# Patient Record
Sex: MALE | Race: WHITE | NOT HISPANIC OR LATINO | Employment: FULL TIME | URBAN - METROPOLITAN AREA
[De-identification: names, ages, dates, MRNs, and addresses within clinical notes are randomized per-mention and may not be internally consistent; named-entity substitution may affect disease eponyms.]

---

## 2022-01-14 ENCOUNTER — LAB REQUISITION (OUTPATIENT)
Dept: LAB | Facility: CLINIC | Age: 57
End: 2022-01-14

## 2022-01-14 DIAGNOSIS — L08.9 LOCAL INFECTION OF THE SKIN AND SUBCUTANEOUS TISSUE, UNSPECIFIED: ICD-10-CM

## 2022-01-14 PROCEDURE — 87077 CULTURE AEROBIC IDENTIFY: CPT | Mod: ORL | Performed by: DERMATOLOGY

## 2022-01-14 PROCEDURE — 87798 DETECT AGENT NOS DNA AMP: CPT | Mod: ORL | Performed by: DERMATOLOGY

## 2022-01-15 LAB — VZV DNA SPEC QL NAA+PROBE: NEGATIVE

## 2022-01-16 LAB — BACTERIA SPEC CULT: ABNORMAL

## 2022-09-26 ENCOUNTER — HOSPITAL ENCOUNTER (INPATIENT)
Facility: CLINIC | Age: 57
LOS: 2 days | Discharge: HOME | DRG: 247 | End: 2022-09-28
Attending: EMERGENCY MEDICINE | Admitting: INTERNAL MEDICINE

## 2022-09-26 ENCOUNTER — APPOINTMENT (OUTPATIENT)
Dept: CT IMAGING | Facility: CLINIC | Age: 57
DRG: 247 | End: 2022-09-26
Attending: EMERGENCY MEDICINE

## 2022-09-26 ENCOUNTER — APPOINTMENT (OUTPATIENT)
Dept: GENERAL RADIOLOGY | Facility: CLINIC | Age: 57
DRG: 247 | End: 2022-09-26
Attending: EMERGENCY MEDICINE

## 2022-09-26 DIAGNOSIS — C43.9 MELANOMA OF SKIN (H): ICD-10-CM

## 2022-09-26 DIAGNOSIS — I21.9 ACUTE MYOCARDIAL INFARCTION, INITIAL EPISODE OF CARE (H): ICD-10-CM

## 2022-09-26 DIAGNOSIS — I21.4 NSTEMI (NON-ST ELEVATED MYOCARDIAL INFARCTION) (H): Primary | ICD-10-CM

## 2022-09-26 LAB
ACT BLD: 228 SECONDS (ref 74–150)
ACT BLD: 271 SECONDS (ref 74–150)
ANION GAP SERPL CALCULATED.3IONS-SCNC: 7 MMOL/L (ref 3–14)
BASOPHILS # BLD AUTO: 0 10E3/UL (ref 0–0.2)
BASOPHILS NFR BLD AUTO: 1 %
BUN SERPL-MCNC: 22 MG/DL (ref 7–30)
CALCIUM SERPL-MCNC: 9.2 MG/DL (ref 8.5–10.1)
CHLORIDE BLD-SCNC: 104 MMOL/L (ref 94–109)
CHOLEST SERPL-MCNC: 143 MG/DL
CO2 SERPL-SCNC: 28 MMOL/L (ref 20–32)
CREAT SERPL-MCNC: 1.08 MG/DL (ref 0.66–1.25)
EOSINOPHIL # BLD AUTO: 0.2 10E3/UL (ref 0–0.7)
EOSINOPHIL NFR BLD AUTO: 5 %
ERYTHROCYTE [DISTWIDTH] IN BLOOD BY AUTOMATED COUNT: 11.6 % (ref 10–15)
GFR SERPL CREATININE-BSD FRML MDRD: 81 ML/MIN/1.73M2
GLUCOSE BLD-MCNC: 137 MG/DL (ref 70–99)
HCT VFR BLD AUTO: 49.1 % (ref 40–53)
HDLC SERPL-MCNC: 28 MG/DL
HGB BLD-MCNC: 17.3 G/DL (ref 13.3–17.7)
IMM GRANULOCYTES # BLD: 0 10E3/UL
IMM GRANULOCYTES NFR BLD: 1 %
LDLC SERPL CALC-MCNC: 94 MG/DL
LYMPHOCYTES # BLD AUTO: 2.1 10E3/UL (ref 0.8–5.3)
LYMPHOCYTES NFR BLD AUTO: 42 %
MCH RBC QN AUTO: 32.2 PG (ref 26.5–33)
MCHC RBC AUTO-ENTMCNC: 35.2 G/DL (ref 31.5–36.5)
MCV RBC AUTO: 91 FL (ref 78–100)
MONOCYTES # BLD AUTO: 0.4 10E3/UL (ref 0–1.3)
MONOCYTES NFR BLD AUTO: 8 %
NEUTROPHILS # BLD AUTO: 2.1 10E3/UL (ref 1.6–8.3)
NEUTROPHILS NFR BLD AUTO: 43 %
NONHDLC SERPL-MCNC: 115 MG/DL
NRBC # BLD AUTO: 0 10E3/UL
NRBC BLD AUTO-RTO: 0 /100
PLATELET # BLD AUTO: 213 10E3/UL (ref 150–450)
POTASSIUM BLD-SCNC: 3.9 MMOL/L (ref 3.4–5.3)
RBC # BLD AUTO: 5.37 10E6/UL (ref 4.4–5.9)
SARS-COV-2 RNA RESP QL NAA+PROBE: NEGATIVE
SODIUM SERPL-SCNC: 139 MMOL/L (ref 133–144)
TRIGL SERPL-MCNC: 105 MG/DL
TROPONIN I SERPL HS-MCNC: 136 NG/L
TROPONIN I SERPL HS-MCNC: 212 NG/L
TROPONIN I SERPL HS-MCNC: 995 NG/L
UFH PPP CHRO-ACNC: 0.25 IU/ML
WBC # BLD AUTO: 4.8 10E3/UL (ref 4–11)

## 2022-09-26 PROCEDURE — 99152 MOD SED SAME PHYS/QHP 5/>YRS: CPT | Performed by: INTERNAL MEDICINE

## 2022-09-26 PROCEDURE — 93010 ELECTROCARDIOGRAM REPORT: CPT | Performed by: INTERNAL MEDICINE

## 2022-09-26 PROCEDURE — 85520 HEPARIN ASSAY: CPT | Performed by: EMERGENCY MEDICINE

## 2022-09-26 PROCEDURE — 85347 COAGULATION TIME ACTIVATED: CPT

## 2022-09-26 PROCEDURE — 250N000011 HC RX IP 250 OP 636: Performed by: INTERNAL MEDICINE

## 2022-09-26 PROCEDURE — 93005 ELECTROCARDIOGRAM TRACING: CPT

## 2022-09-26 PROCEDURE — C1887 CATHETER, GUIDING: HCPCS | Performed by: INTERNAL MEDICINE

## 2022-09-26 PROCEDURE — 99223 1ST HOSP IP/OBS HIGH 75: CPT | Mod: 25 | Performed by: INTERNAL MEDICINE

## 2022-09-26 PROCEDURE — 250N000013 HC RX MED GY IP 250 OP 250 PS 637: Performed by: INTERNAL MEDICINE

## 2022-09-26 PROCEDURE — 85025 COMPLETE CBC W/AUTO DIFF WBC: CPT | Performed by: EMERGENCY MEDICINE

## 2022-09-26 PROCEDURE — C1769 GUIDE WIRE: HCPCS | Performed by: INTERNAL MEDICINE

## 2022-09-26 PROCEDURE — 36415 COLL VENOUS BLD VENIPUNCTURE: CPT | Performed by: EMERGENCY MEDICINE

## 2022-09-26 PROCEDURE — 99153 MOD SED SAME PHYS/QHP EA: CPT | Performed by: INTERNAL MEDICINE

## 2022-09-26 PROCEDURE — 99285 EMERGENCY DEPT VISIT HI MDM: CPT | Mod: 25

## 2022-09-26 PROCEDURE — 258N000003 HC RX IP 258 OP 636: Performed by: INTERNAL MEDICINE

## 2022-09-26 PROCEDURE — 250N000009 HC RX 250: Performed by: INTERNAL MEDICINE

## 2022-09-26 PROCEDURE — 92928 PRQ TCAT PLMT NTRAC ST 1 LES: CPT | Mod: LC | Performed by: INTERNAL MEDICINE

## 2022-09-26 PROCEDURE — 84484 ASSAY OF TROPONIN QUANT: CPT | Performed by: INTERNAL MEDICINE

## 2022-09-26 PROCEDURE — U0003 INFECTIOUS AGENT DETECTION BY NUCLEIC ACID (DNA OR RNA); SEVERE ACUTE RESPIRATORY SYNDROME CORONAVIRUS 2 (SARS-COV-2) (CORONAVIRUS DISEASE [COVID-19]), AMPLIFIED PROBE TECHNIQUE, MAKING USE OF HIGH THROUGHPUT TECHNOLOGIES AS DESCRIBED BY CMS-2020-01-R: HCPCS | Performed by: EMERGENCY MEDICINE

## 2022-09-26 PROCEDURE — 93454 CORONARY ARTERY ANGIO S&I: CPT | Mod: 26 | Performed by: INTERNAL MEDICINE

## 2022-09-26 PROCEDURE — 250N000009 HC RX 250: Performed by: EMERGENCY MEDICINE

## 2022-09-26 PROCEDURE — 272N000001 HC OR GENERAL SUPPLY STERILE: Performed by: INTERNAL MEDICINE

## 2022-09-26 PROCEDURE — C1874 STENT, COATED/COV W/DEL SYS: HCPCS | Performed by: INTERNAL MEDICINE

## 2022-09-26 PROCEDURE — C1725 CATH, TRANSLUMIN NON-LASER: HCPCS | Performed by: INTERNAL MEDICINE

## 2022-09-26 PROCEDURE — C1894 INTRO/SHEATH, NON-LASER: HCPCS | Performed by: INTERNAL MEDICINE

## 2022-09-26 PROCEDURE — 80061 LIPID PANEL: CPT | Performed by: INTERNAL MEDICINE

## 2022-09-26 PROCEDURE — 83036 HEMOGLOBIN GLYCOSYLATED A1C: CPT | Performed by: PHYSICIAN ASSISTANT

## 2022-09-26 PROCEDURE — 027034Z DILATION OF CORONARY ARTERY, ONE ARTERY WITH DRUG-ELUTING INTRALUMINAL DEVICE, PERCUTANEOUS APPROACH: ICD-10-PCS | Performed by: INTERNAL MEDICINE

## 2022-09-26 PROCEDURE — 96374 THER/PROPH/DIAG INJ IV PUSH: CPT | Mod: 59

## 2022-09-26 PROCEDURE — 84484 ASSAY OF TROPONIN QUANT: CPT | Performed by: EMERGENCY MEDICINE

## 2022-09-26 PROCEDURE — 36415 COLL VENOUS BLD VENIPUNCTURE: CPT | Performed by: INTERNAL MEDICINE

## 2022-09-26 PROCEDURE — 71046 X-RAY EXAM CHEST 2 VIEWS: CPT

## 2022-09-26 PROCEDURE — 71275 CT ANGIOGRAPHY CHEST: CPT

## 2022-09-26 PROCEDURE — 210N000002 HC R&B HEART CARE

## 2022-09-26 PROCEDURE — 250N000011 HC RX IP 250 OP 636: Performed by: EMERGENCY MEDICINE

## 2022-09-26 PROCEDURE — 82435 ASSAY OF BLOOD CHLORIDE: CPT | Performed by: EMERGENCY MEDICINE

## 2022-09-26 PROCEDURE — 250N000013 HC RX MED GY IP 250 OP 250 PS 637: Performed by: EMERGENCY MEDICINE

## 2022-09-26 PROCEDURE — 93454 CORONARY ARTERY ANGIO S&I: CPT | Performed by: INTERNAL MEDICINE

## 2022-09-26 PROCEDURE — B2111ZZ FLUOROSCOPY OF MULTIPLE CORONARY ARTERIES USING LOW OSMOLAR CONTRAST: ICD-10-PCS | Performed by: INTERNAL MEDICINE

## 2022-09-26 PROCEDURE — 99223 1ST HOSP IP/OBS HIGH 75: CPT | Mod: AI | Performed by: INTERNAL MEDICINE

## 2022-09-26 PROCEDURE — C9600 PERC DRUG-EL COR STENT SING: HCPCS | Mod: LC | Performed by: INTERNAL MEDICINE

## 2022-09-26 PROCEDURE — 99152 MOD SED SAME PHYS/QHP 5/>YRS: CPT | Mod: GC | Performed by: INTERNAL MEDICINE

## 2022-09-26 PROCEDURE — 93005 ELECTROCARDIOGRAM TRACING: CPT | Mod: 76

## 2022-09-26 PROCEDURE — C9803 HOPD COVID-19 SPEC COLLECT: HCPCS

## 2022-09-26 DEVICE — STENT CORONARY DES SYNERGY XD MR US 3.00X28MM H7493941828300: Type: IMPLANTABLE DEVICE | Status: FUNCTIONAL

## 2022-09-26 RX ORDER — ASPIRIN 325 MG
325 TABLET ORAL ONCE
Status: DISCONTINUED | OUTPATIENT
Start: 2022-09-26 | End: 2022-09-26 | Stop reason: HOSPADM

## 2022-09-26 RX ORDER — NALOXONE HYDROCHLORIDE 0.4 MG/ML
0.2 INJECTION, SOLUTION INTRAMUSCULAR; INTRAVENOUS; SUBCUTANEOUS
Status: DISCONTINUED | OUTPATIENT
Start: 2022-09-26 | End: 2022-09-28 | Stop reason: HOSPADM

## 2022-09-26 RX ORDER — IOPAMIDOL 755 MG/ML
INJECTION, SOLUTION INTRAVASCULAR
Status: DISCONTINUED | OUTPATIENT
Start: 2022-09-26 | End: 2022-09-26 | Stop reason: HOSPADM

## 2022-09-26 RX ORDER — NITROGLYCERIN 0.4 MG/1
0.4 TABLET SUBLINGUAL EVERY 5 MIN PRN
Status: DISCONTINUED | OUTPATIENT
Start: 2022-09-26 | End: 2022-09-27

## 2022-09-26 RX ORDER — NALOXONE HYDROCHLORIDE 0.4 MG/ML
0.4 INJECTION, SOLUTION INTRAMUSCULAR; INTRAVENOUS; SUBCUTANEOUS
Status: DISCONTINUED | OUTPATIENT
Start: 2022-09-26 | End: 2022-09-28 | Stop reason: HOSPADM

## 2022-09-26 RX ORDER — ASPIRIN 81 MG/1
243 TABLET, CHEWABLE ORAL ONCE
Status: DISCONTINUED | OUTPATIENT
Start: 2022-09-26 | End: 2022-09-26 | Stop reason: HOSPADM

## 2022-09-26 RX ORDER — ONDANSETRON 4 MG/1
4 TABLET, ORALLY DISINTEGRATING ORAL EVERY 6 HOURS PRN
Status: DISCONTINUED | OUTPATIENT
Start: 2022-09-26 | End: 2022-09-26

## 2022-09-26 RX ORDER — LIDOCAINE 40 MG/G
CREAM TOPICAL
Status: DISCONTINUED | OUTPATIENT
Start: 2022-09-26 | End: 2022-09-26

## 2022-09-26 RX ORDER — ROSUVASTATIN CALCIUM 20 MG/1
20 TABLET, COATED ORAL DAILY
Status: DISCONTINUED | OUTPATIENT
Start: 2022-09-26 | End: 2022-09-28 | Stop reason: HOSPADM

## 2022-09-26 RX ORDER — ASPIRIN 81 MG/1
81 TABLET ORAL DAILY
Status: DISCONTINUED | OUTPATIENT
Start: 2022-09-27 | End: 2022-09-27

## 2022-09-26 RX ORDER — HEPARIN SODIUM 1000 [USP'U]/ML
INJECTION, SOLUTION INTRAVENOUS; SUBCUTANEOUS
Status: DISCONTINUED | OUTPATIENT
Start: 2022-09-26 | End: 2022-09-26 | Stop reason: HOSPADM

## 2022-09-26 RX ORDER — ONDANSETRON 4 MG/1
4 TABLET, ORALLY DISINTEGRATING ORAL EVERY 6 HOURS PRN
Status: DISCONTINUED | OUTPATIENT
Start: 2022-09-26 | End: 2022-09-27

## 2022-09-26 RX ORDER — ONDANSETRON 2 MG/ML
4 INJECTION INTRAMUSCULAR; INTRAVENOUS EVERY 6 HOURS PRN
Status: DISCONTINUED | OUTPATIENT
Start: 2022-09-26 | End: 2022-09-27

## 2022-09-26 RX ORDER — ONDANSETRON 2 MG/ML
4 INJECTION INTRAMUSCULAR; INTRAVENOUS EVERY 6 HOURS PRN
Status: DISCONTINUED | OUTPATIENT
Start: 2022-09-26 | End: 2022-09-26

## 2022-09-26 RX ORDER — METOPROLOL TARTRATE 1 MG/ML
5 INJECTION, SOLUTION INTRAVENOUS
Status: DISCONTINUED | OUTPATIENT
Start: 2022-09-26 | End: 2022-09-27

## 2022-09-26 RX ORDER — LORAZEPAM 0.5 MG/1
0.5 TABLET ORAL
Status: DISCONTINUED | OUTPATIENT
Start: 2022-09-26 | End: 2022-09-26 | Stop reason: HOSPADM

## 2022-09-26 RX ORDER — ASPIRIN 81 MG/1
81 TABLET, CHEWABLE ORAL ONCE
Status: DISCONTINUED | OUTPATIENT
Start: 2022-09-26 | End: 2022-09-26

## 2022-09-26 RX ORDER — NITROGLYCERIN 5 MG/ML
VIAL (ML) INTRAVENOUS
Status: DISCONTINUED | OUTPATIENT
Start: 2022-09-26 | End: 2022-09-26 | Stop reason: HOSPADM

## 2022-09-26 RX ORDER — LIDOCAINE 40 MG/G
CREAM TOPICAL
Status: DISCONTINUED | OUTPATIENT
Start: 2022-09-26 | End: 2022-09-28 | Stop reason: HOSPADM

## 2022-09-26 RX ORDER — POTASSIUM CHLORIDE 1500 MG/1
20 TABLET, EXTENDED RELEASE ORAL
Status: DISCONTINUED | OUTPATIENT
Start: 2022-09-26 | End: 2022-09-26 | Stop reason: HOSPADM

## 2022-09-26 RX ORDER — LORAZEPAM 2 MG/ML
0.5 INJECTION INTRAMUSCULAR
Status: DISCONTINUED | OUTPATIENT
Start: 2022-09-26 | End: 2022-09-26 | Stop reason: HOSPADM

## 2022-09-26 RX ORDER — ATORVASTATIN CALCIUM 80 MG/1
80 TABLET, FILM COATED ORAL DAILY
Status: DISCONTINUED | OUTPATIENT
Start: 2022-09-26 | End: 2022-09-26 | Stop reason: ALTCHOICE

## 2022-09-26 RX ORDER — HEPARIN SODIUM 10000 [USP'U]/100ML
0-5000 INJECTION, SOLUTION INTRAVENOUS CONTINUOUS
Status: DISCONTINUED | OUTPATIENT
Start: 2022-09-26 | End: 2022-09-26

## 2022-09-26 RX ORDER — ACETAMINOPHEN 325 MG/1
650 TABLET ORAL EVERY 4 HOURS PRN
Status: DISCONTINUED | OUTPATIENT
Start: 2022-09-26 | End: 2022-09-27

## 2022-09-26 RX ORDER — IOPAMIDOL 755 MG/ML
62 INJECTION, SOLUTION INTRAVASCULAR ONCE
Status: COMPLETED | OUTPATIENT
Start: 2022-09-26 | End: 2022-09-26

## 2022-09-26 RX ORDER — SODIUM CHLORIDE 9 MG/ML
INJECTION, SOLUTION INTRAVENOUS CONTINUOUS
Status: DISCONTINUED | OUTPATIENT
Start: 2022-09-26 | End: 2022-09-26 | Stop reason: HOSPADM

## 2022-09-26 RX ORDER — MORPHINE SULFATE 2 MG/ML
1 INJECTION, SOLUTION INTRAMUSCULAR; INTRAVENOUS
Status: DISCONTINUED | OUTPATIENT
Start: 2022-09-26 | End: 2022-09-28 | Stop reason: HOSPADM

## 2022-09-26 RX ORDER — VERAPAMIL HYDROCHLORIDE 2.5 MG/ML
INJECTION, SOLUTION INTRAVENOUS
Status: DISCONTINUED | OUTPATIENT
Start: 2022-09-26 | End: 2022-09-26 | Stop reason: HOSPADM

## 2022-09-26 RX ORDER — HYDRALAZINE HYDROCHLORIDE 20 MG/ML
10 INJECTION INTRAMUSCULAR; INTRAVENOUS EVERY 4 HOURS PRN
Status: DISCONTINUED | OUTPATIENT
Start: 2022-09-26 | End: 2022-09-27

## 2022-09-26 RX ORDER — FENTANYL CITRATE 50 UG/ML
INJECTION, SOLUTION INTRAMUSCULAR; INTRAVENOUS
Status: DISCONTINUED | OUTPATIENT
Start: 2022-09-26 | End: 2022-09-26 | Stop reason: HOSPADM

## 2022-09-26 RX ORDER — SODIUM CHLORIDE 9 MG/ML
INJECTION, SOLUTION INTRAVENOUS CONTINUOUS
Status: DISCONTINUED | OUTPATIENT
Start: 2022-09-26 | End: 2022-09-28 | Stop reason: HOSPADM

## 2022-09-26 RX ORDER — NITROGLYCERIN 0.4 MG/1
0.4 TABLET SUBLINGUAL EVERY 5 MIN PRN
Status: DISCONTINUED | OUTPATIENT
Start: 2022-09-26 | End: 2022-09-26

## 2022-09-26 RX ORDER — ACETAMINOPHEN 325 MG/1
650 TABLET ORAL EVERY 6 HOURS PRN
Status: DISCONTINUED | OUTPATIENT
Start: 2022-09-26 | End: 2022-09-26

## 2022-09-26 RX ORDER — ACETAMINOPHEN 650 MG/1
650 SUPPOSITORY RECTAL EVERY 6 HOURS PRN
Status: DISCONTINUED | OUTPATIENT
Start: 2022-09-26 | End: 2022-09-28 | Stop reason: HOSPADM

## 2022-09-26 RX ORDER — HYDROMORPHONE HYDROCHLORIDE 1 MG/ML
0.5 INJECTION, SOLUTION INTRAMUSCULAR; INTRAVENOUS; SUBCUTANEOUS ONCE
Status: DISCONTINUED | OUTPATIENT
Start: 2022-09-26 | End: 2022-09-26

## 2022-09-26 RX ORDER — ASPIRIN 81 MG/1
324 TABLET, CHEWABLE ORAL ONCE
Status: COMPLETED | OUTPATIENT
Start: 2022-09-26 | End: 2022-09-26

## 2022-09-26 RX ADMIN — ROSUVASTATIN CALCIUM 20 MG: 20 TABLET, FILM COATED ORAL at 10:33

## 2022-09-26 RX ADMIN — SODIUM CHLORIDE: 9 INJECTION, SOLUTION INTRAVENOUS at 13:25

## 2022-09-26 RX ADMIN — TICAGRELOR 90 MG: 90 TABLET ORAL at 19:16

## 2022-09-26 RX ADMIN — NITROGLYCERIN 0.4 MG: 0.4 TABLET SUBLINGUAL at 06:39

## 2022-09-26 RX ADMIN — ACETAMINOPHEN 650 MG: 325 TABLET, FILM COATED ORAL at 13:24

## 2022-09-26 RX ADMIN — SODIUM CHLORIDE 88 ML: 900 INJECTION INTRAVENOUS at 07:18

## 2022-09-26 RX ADMIN — IOPAMIDOL 62 ML: 755 INJECTION, SOLUTION INTRAVENOUS at 07:18

## 2022-09-26 RX ADMIN — SODIUM CHLORIDE: 9 INJECTION, SOLUTION INTRAVENOUS at 10:35

## 2022-09-26 RX ADMIN — HEPARIN SODIUM 850 UNITS/HR: 10000 INJECTION, SOLUTION INTRAVENOUS at 07:43

## 2022-09-26 RX ADMIN — ASPIRIN 81 MG CHEWABLE TABLET 324 MG: 81 TABLET CHEWABLE at 06:38

## 2022-09-26 ASSESSMENT — ENCOUNTER SYMPTOMS
VOMITING: 0
COUGH: 0
HEADACHES: 1
FEVER: 0
CHEST TIGHTNESS: 1
DIAPHORESIS: 0
SHORTNESS OF BREATH: 1
ABDOMINAL PAIN: 0
NECK PAIN: 1
DIARRHEA: 0
NAUSEA: 0

## 2022-09-26 ASSESSMENT — ACTIVITIES OF DAILY LIVING (ADL)
ADLS_ACUITY_SCORE: 35
ADLS_ACUITY_SCORE: 35
ADLS_ACUITY_SCORE: 18
ADLS_ACUITY_SCORE: 35
ADLS_ACUITY_SCORE: 18

## 2022-09-26 NOTE — H&P
[facility]  Hospitalist History and Physical    Name: Jorge Thomas    MRN: [id number]  YOB: 1965    Age: 57 year old  Date of Admission:  9/26/2022  Physician:  Presley Monae DO, FHM  Securely message with the Vocera Web Console (learn more here)  Text page via [facility] Paging/Directory         Assessment & Plan   Jorge Thomas is a 57 year old male without prior cardiac history who presented to the [facility] ER with chest discomfort.    NSTEMI:  Summary:  56 y/o male presented with chest discomfort this AM and associated mildly elevated troponin.  Also had a similar episode of chest discomfort about 2 weeks ago he did not seek eval for.  Chest imaging negative for PE/pneumonia.  He has incidentally noted coronary calcifications.    -  Admit  -   mg given already, will continue 81 mg daily starting tomorrow  -  Continue heparin drip started in ED  -  Echocardiogram this AM, consider beta blocker  -  NPO for possible cath need  -  Check lipid panel (added on to initial labs)  -  Nitroglycerin/opioids for pain  -  Trend troponin  -  Cardiology consult, discussed patient with Dr. Mattson    History of melanoma:  -  No recent issues, diagnosed over 5 years ago.  Follows for routine skin monitoring.    COVID Status/Screening:  COVID-19 PCR Results    COVID-19 PCR Results 4/26/21 9/26/22   COVID-19 Virus by PCR (External Result) Not Detected    SARS CoV2 PCR  Negative      Comments are available for some flowsheets but are not being displayed.         COVID-19 Antibody Results, Testing for Immunity    COVID-19 Antibody Results, Testing for Immunity   No data to display.            DVT Prophylaxis: Pneumatic Compression Devices + heparin drip  Code Status: Full Code  Rubio Catheter: Not present  Central Lines: None  Cardiac Monitoring: None    Disposition: Expect 2 night hospital stay.    Primary Care Physician   Blaire Hollins, [phone]    Chief Complaint   Chest pain    History is  obtained from the patient.  I also spoke with the ER provider about the history.     History of Present Illness   Jorge Thomas is a 57 year old male who presents with chest discomfort.  He awoke this AM with it and it was a significant central chest pressure that radiated to left arm.  He had a mild associated headache.  He had SOB and was somewhat sweaty with it.  Pain rated 7/10 or higher at its worst.  It persisted and he came in for eval.  In the ED after nitroglycerin + opioids the pain is now almost gone.  No history of prior known cardiac problems.  He doesn't smoke.  He denies major medical problems except for melanoma.  He follows with derm  but otherwise admits he hasn't seen many providers/had a physical for a few years.   He had a similar chest pain episode 2 weeks ago on a business trip when walking around.  He rested and it resolved after several minutes.  He did not seek eval for that episode.  In between he has gone on walks with his wife and done some [employer] fitness activity without chest pain or SOB.  His father had cardiac bypass in his 60's.  His mother has had some arrhythmia issues.  Patient denies other acute complaints.    Past Medical History    Past Medical History:   Diagnosis Date   Allergic rhinitis    Melanoma Skin    Past Surgical History   Past Surgical History:   Procedure Laterality Date   APPENDECTOMY     Eye Surgery    Prior to Admission Medications   Denies any prescription or routine OTC/Herbal meds    Allergies   No Known Allergies    Social History   Social History     Tobacco Use     Smoking status: Never Smoker     Smokeless tobacco: Never Used   Substance Use Topics     Alcohol use: Yes   (Rare alcohol)  Social History     Social History Narrative    , 2 kids, works as a        Family History   See above concerning cardiac hx.  Reviewed, otherwise negative for contributory info.    Review of Systems   A Comprehensive greater than 10 system review of  systems was carried out.  Pertinent positives and negatives are noted above.  Otherwise negative for contributory information.    Physical Exam   Temp: 98.1  F (36.7  C) Temp src: Oral BP: (!) 151/108 Pulse: 67   Resp: 8 SpO2: 99 %      Vital Signs with Ranges  Temp:  [98.1  F (36.7  C)] 98.1  F (36.7  C)  Pulse:  [67-71] 67  Resp:  [8-18] 8  BP: (151)/(108) 151/108  SpO2:  [98 %-99 %] 99 %  158 lbs 0 oz    GEN:  Alert, oriented x 3, appears comfortable, no overt distress  HEENT:  Normocephalic/atraumatic, no scleral icterus, no nasal discharge, mouth moist.  CV:  Regular rate and rhythm, no murmur or JVD.  S1 + S2 noted, no S3 or S4.  LUNGS:  Clear to auscultation bilaterally without rales/rhonchi/wheezing/retractions.  Symmetric chest rise on inhalation noted.  ABD:  Active bowel sounds, soft, non-tender, mildly distended.  No guarding/rigidity.  EXT:  No edema.  No cyanosis.  No acute joint synovitis noted.  SKIN:  Dry to touch, no exanthems noted in the visualized areas.  NEURO:  Moving extremities well on general exam, sensation to touch grossly intact.  No new focal deficits appreciated.      Data   Data reviewed today:  I personally reviewed the EKG tracing showing no dramatic ST elevation/depression.  A little peaking of T waves.  (2 EKG's reviewed).    Recent Labs   Lab 09/26/22  0606   WBC 4.8   HGB 17.3   HCT 49.1   MCV 91        Recent Labs   Lab 09/26/22  0606      POTASSIUM 3.9   CHLORIDE 104   CO2 28   ANIONGAP 7   *   BUN 22   CR 1.08   GFRESTIMATED 81   MICHELLE 9.2     Recent Labs   Lab 09/26/22  0606   TROPONINIS 136*       Recent Results (from the past 24 hour(s))   Chest XR,  PA & LAT    Narrative    EXAM: XR CHEST 2 VIEWS  LOCATION: [facility]  DATE/TIME: 9/26/2022 6:53 AM    INDICATION: chest pain  COMPARISON: None.      Impression    IMPRESSION: Heart size is normal. Lungs are clear. No pneumothorax or pleural effusion.   CT Chest Pulmonary Embolism w  Contrast    Narrative    CT CHEST PULMONARY EMBOLISM W CONTRAST 9/26/2022 7:25 AM    CLINICAL HISTORY: chest pain, shortness of breath  TECHNIQUE: CT angiogram chest during arterial phase injection IV  contrast. 2D and 3D MIP reconstructions were performed by the CT  technologist. Dose reduction techniques were used.     CONTRAST: 62mL Isovue-370    COMPARISON: None.    FINDINGS:  ANGIOGRAM CHEST: Pulmonary arteries are normal caliber and negative  for pulmonary emboli. Thoracic aorta is negative for dissection. No CT  evidence of right heart strain.    LUNGS AND PLEURA: Minimal biapical fibrosis. Lungs otherwise clear. No  pleural effusion or pneumothorax.    MEDIASTINUM/AXILLAE: No lymphadenopathy. Moderate coronary artery  calcification.    UPPER ABDOMEN: Normal.    MUSCULOSKELETAL: Normal.      Impression    IMPRESSION:  1.  No pulmonary embolus.  2.  No acute findings in the chest.  3.  Moderate coronary artery calcification.    JAMEEL BARRERA MD         SYSTEM ID:  [id number]

## 2022-09-26 NOTE — CONSULTS
Inpatient Cardiology Consultation.   [facility]  Date of Admission: 2022  Date of Consult: 2022      REASON FOR CONSULT:  Non-ST elevation myocardial infarction.    HISTORY OF PRESENT ILLNESS:  Mr. Spencer is a delightful, non-obese 57-year-old  male, employed as a , accompanied by his wife Josy and emergency room 26, when I visited.  He is not known to have any chronic medical illnesses, not on any prescription medications, never tobacco user.    Cardiology is being consulted for non-ST elevation myocardial infarction.    Jorge was woken up at around 4 AM this morning with central chest pain radiating to his neck, not relieved by change in position or drinking water.  He appropriately came to the emergency room where his vital signs were stable.  ECG showed sinus rhythm with inferolateral ST depression and normal cardiac intervals (no old ECG for comparison).  High-sensitivity troponin I was elevated at 136 initially, second increased to 212.  He is currently chest pain-free.  Renal function and CBC.    Notably, 2 weeks ago when he was traveling in [place] on a work trip, he had similar but milder chest discomfort that occurred at rest and lasted on and off for 2 hours.  He did not seek help at the time because his symptoms were not severe and he was traveling.    Risk factors are undiagnosed low HDL dyslipidemia (HDL is 28, LDL 94, normal triglycerides, not on lipid-lowering therapy), and a family history of premature coronary artery disease with his father undergoing CABG in his late 50s) and subsequently  at age 85 of unknown reason).    Patient does not have hypertension, diabetes, tobacco or recreational drug use history, renal disease.      DIAGNOSTIC DATA:  I have personally reviewed labs, ECG tracing, chest x-ray images and CT chest.    Labs: Creatinine 1.0, estimated GFR 81, total cholesterol 143, low HDL of 28, LDL 94, triglycerides  105.  High-sensitivity troponin I 136 -- 212.  Hemoglobin 17.3.  Normal platelets.    ECG: Sinus rhythm, 60 bpm, inferolateral ST depression, normal cardiac intervals (no previous ECG for comparison).    Chest x-ray:  Heart size is normal. Lungs are clear. No pneumothorax or pleural effusion.    CT chest:   1.  No pulmonary embolus.  2.  No acute findings in the chest.  3.  Moderate coronary artery calcification.    Echocardiogram: Ordered and pending.      DIAGNOSES:   1.  Non-ST elevation myocardial infarction.  2.  New diagnosis of low HDL dyslipidemia.  3.  Family history of premature coronary artery disease and coronary artery bypass grafting in father.    ASSESSMENT:  Patient is a 57-year-old male with classic symptoms, elevated troponin and abnormal ECG consistent with a non-ST elevation myocardial infarction.  Guideline directed medical therapy and heart catheterization recommended.    PLAN:  1.  Agree with the aspirin, IV heparin.  Beta-blocker not added due to heart rate in the 60s and BP of 128/70 mmHg.  2.  Start rosuvastatin 20 mg daily.  3.  Heart catheterization and intervention, as needed.  Risks and benefits discussed with the patient.  Informed consent obtained.  I have ordered the procedure and discussed personally with the Cath Lab charge nurse.  No known contrast allergy, no contraindication to DAPT, normal creatinine, no bleeding issues.  4.  I have ordered transthoracic echocardiogram, hemoglobin A1c and fasting glucose for diabetes screening.  5.  Cardiology will follow.  Thank you for consulting us.    High complexity medical decision making and care coordination for treatment of acute myocardial infarction.      Frank Guevara MD, MD FACC  Cardiology        CODE STATUS:  Full Code.    REVIEW OF SYSTEMS:  A comprehensive 10 point review of systems was completed and the pertinent positives are documented in history of present illness.      FAMILY HISTORY:  Family History   Problem  Relation Age of Onset     CABG Father 60     Cardiomyopathy No family hx of      Valvular heart disease No family hx of        MEDICATIONS:  None       HOME MEDICATIONS:  None       ALLERGIES:  No Known Allergies    PAST MEDICAL HISTORY:  Past Medical History:   Diagnosis Date     Allergic rhinitis    The patient denies history including diabetes, hypertension, and hyperlipidemia.    PAST SURGICAL HISTORY:  Past Surgical History:   Procedure Laterality Date     APPENDECTOMY         SOCIAL HISTORY:   Jorge Thomas  reports that he has never smoked. He has never used smokeless tobacco. He reports current alcohol use. He reports that he does not use drugs.      PHYSICAL EXAMINATION:  Temp: 98.1  F (36.7  C) Temp src: Oral BP: (!) 151/108 Pulse: 67   Resp: 8 SpO2: 99 %      No intake/output data recorded.  Vitals:    09/26/22 0546   Weight: 71.7 kg (158 lb)       Constitutional: Comfortable at rest. Cooperative, alert and oriented, well developed, well nourished.  Eyes: Pupils equal and round, no pallor or icterus.  ENT: No cyanosis or pallor.  Moist mucous membranes.  Neck: No thyromegaly.     Respiratory: Normal respiratory effort with symmetrical chest wall movements and no use of accessory muscles. Bilateral normal breath sounds. No rales or wheeze.  Cardiovascular: Normal jugular venous pulse and pressure.  Normal carotid pulse character and volume.  No carotid bruit.  Normal apical impulse.  Regular heart sounds.  No S3 or S4.  No murmur or friction rub.  GI: Soft, nontender, active bowel sounds.  No hepatosplenomegaly, ascites or abdominal wall edema.  Skin: No rash, erythema, ecchymosis.  Musculoskeletal: Normal muscle tone and strength.   Neuropsychiatric: Oriented to time place and person.  Affect normal.  No gross motor deficits.  Extremities: No edema or clubbing.    Clinically Significant Risk Factors Present on Admission                             Frank Guevara MD, MD

## 2022-09-26 NOTE — Clinical Note
Stent deployed in the left anterior descending. Max pressure = 16 azalea. Total duration = 14 seconds.

## 2022-09-26 NOTE — ED PROVIDER NOTES
History   Chief Complaint:  Chest Pain       The history is provided by the patient.      Jorge Thomas is a 57 year old male who presents with chest pain. The patient woke up from sleep around 04:30 today feeling chest pain and tightness. Along with the chest pain, the patient noticed an abnormal sensation in his left arm and a headache that radiates to his neck. The patient states that symptoms come and go, but when the pain presents it is very uncomfortable. The patient also reports shortness of breath. The patient is still experiencing symptoms currently and rates his pain as 7/10 in severity. He experienced similar symptoms of chest pain about 2 to 3 weeks ago when he was travelling in [place], however he did not visit the hospital at that time. The patient denies leg pain or swelling, cough, fever, abdominal pain, nausea, vomiting, diarrhea, and diaphoresis. He also denies smoking. The patient notes cardiac history in his father, with a bypass in his late 60s.     Review of Systems   Constitutional: Negative for diaphoresis and fever.   Respiratory: Positive for chest tightness and shortness of breath. Negative for cough.    Cardiovascular: Positive for chest pain. Negative for leg swelling.   Gastrointestinal: Negative for abdominal pain, diarrhea, nausea and vomiting.   Musculoskeletal: Positive for neck pain.   Neurological: Positive for headaches.   All other systems reviewed and are negative.      Allergies:  The patient has no known allergies.     Medications:  The patient is currently on no regular medications.     Past Medical History:     The patient denies history including diabetes, hypertension, and hyperlipidemia.     Past Surgical History:   Appendectomy  [employer] Nevus Melanoma     Family History:    Reports cardiac history - father    Social History:  The patient presents to the ED with his wife, Josy  Tobacco use: denies smoking   PCP: Blaire Hollins     Physical Exam  "    Patient Vitals for the past 24 hrs:   BP Temp Temp src Pulse Resp SpO2 Height Weight   09/26/22 0605 -- -- -- 67 8 99 % -- --   09/26/22 0546 (!) 151/108 98.1  F (36.7  C) Oral 71 18 98 % 1.803 m (5' 11\") 71.7 kg (158 lb)       Physical Exam  Eyes:  The pupils are equal and round    Conjunctivae and sclerae are normal  ENT:    The nose is normal    Pinnae are normal  CV:  Regular rate and rhythm     No edema    Equal radial pulses  Resp:  Lungs are clear    Non-labored    No rales    No wheezing   GI:  Abdomen is soft and non-tender, there is no rigidity    No distension    No rebound tenderness   MS:  Normal muscular tone    No asymmetric leg swelling  Skin:  No rash or acute skin lesions noted  Neuro:   Awake, alert.      Speech is normal and fluent.    Face is symmetric.     Moves all extremities    Emergency Department Course     ECG 1  ECG taken at 0549, ECG read at 0617  Normal sinus rhythm  Nonspecific ST and T wave abnormality  Abnormal ECG  Rate 60 bpm. SC interval 138 ms. QRS duration 92 ms. QT/QTc 408/408 ms. P-R-T axes 62 73 74.     ECG 2  ECG taken at 0711, ECG read at 0716  Normal sinus rhythm with sinus arrhythmia  Septal infarct, age undetermined  Abnormal ECG   No significant change as compared to prior, dated 09/26/2022.  Rate 69 bpm. SC interval 138 ms. QRS duration 92 ms. QT/QTc 400/428 ms. P-R-T axes 66 57 -32.       Imaging:  CT Chest Pulmonary Embolism w Contrast   Final Result   IMPRESSION:   1.  No pulmonary embolus.   2.  No acute findings in the chest.   3.  Moderate coronary artery calcification.      JAMEEL BARRERA MD            SYSTEM ID:  [id number]      Chest XR,  PA & LAT   Final Result   IMPRESSION: Heart size is normal. Lungs are clear. No pneumothorax or pleural effusion.      Echocardiogram Complete    (Results Pending)   Report per radiology      Laboratory:  Labs Ordered and Resulted from Time of ED Arrival to Time of ED Departure   BASIC METABOLIC PANEL - Abnormal       " Result Value    Sodium 139      Potassium 3.9      Chloride 104      Carbon Dioxide (CO2) 28      Anion Gap 7      Urea Nitrogen 22      Creatinine 1.08      Calcium 9.2      Glucose 137 (*)     GFR Estimate 81     TROPONIN I - Abnormal    Troponin I High Sensitivity 136 (*)    TROPONIN I - Abnormal    Troponin I High Sensitivity 212 (*)    LIPID REFLEX TO DIRECT LDL PANEL - Abnormal    Cholesterol 143      Triglycerides 105      Direct Measure HDL 28 (*)     LDL Cholesterol Calculated 94      Non HDL Cholesterol 115     COVID-19 VIRUS (CORONAVIRUS) BY PCR - Normal    SARS CoV2 PCR Negative     CBC WITH PLATELETS AND DIFFERENTIAL    WBC Count 4.8      RBC Count 5.37      Hemoglobin 17.3      Hematocrit 49.1      MCV 91      MCH 32.2      MCHC 35.2      RDW 11.6      Platelet Count 213      % Neutrophils 43      % Lymphocytes 42      % Monocytes 8      % Eosinophils 5      % Basophils 1      % Immature Granulocytes 1      NRBCs per 100 WBC 0      Absolute Neutrophils 2.1      Absolute Lymphocytes 2.1      Absolute Monocytes 0.4      Absolute Eosinophils 0.2      Absolute Basophils 0.0      Absolute Immature Granulocytes 0.0      Absolute NRBCs 0.0     TROPONIN I        Emergency Department Course:     Reviewed:  I reviewed nursing notes, vitals and past medical history    Assessments:  0618 I obtained history and examined the patient as noted above.   0649 I rechecked the patient and explained findings.   0715 I updated the patient. We discussed admission.     Consults:  0720 I consulted with Dr. Monae, hospitalist, regarding the patient's history and presentation, who accepted the patient for admission.      Interventions:  0638 aspirin 324 mg Oral  0639 Nitrostat 0.4 mg SL  0741 heparin loading dose 4,3000 units IV  0743 heparin 25,000 units IV    Disposition:  The patient was admitted to the hospital under the care of Dr. Monae.     Impression & Plan     HEART Score  Background  Calculates the overall risk of  adverse event in patient's presenting with chest pain.  Based on 5 criteria (each assigned 0-2 points) including suspiciousness of history, EKG, age, risk factors and troponin.    Data  57 year old male  Denies past medical history.   reports that he has never smoked. He has never used smokeless tobacco.  family history is not on file.  No results found for: TROPI  Criteria   0-2 points for each of 5 items (maximum of 10 points):  Score 0- History slightly suspicious for coronary syndrome  Score 1- EKG with Non-specific repolarization disturbance  Score 1- Age 45 to 65 years old  Score 1- One to 2 risk factors for atherosclerotic disease  Score 1- One to 2 times the normal troponin upper limit  Interpretation  Risk of adverse outcome  Heart Score: 4  Total Score 4-6- Adverse Outcome Risk 20.3% - Supports admission with standard rule-out management -serial troponins and stress testing      Medical Decision Making:  Jorge Thomas is a 57 year old male who presents to the emergency department with chest pain.  It started this morning at about 430.  Woke him from sleep.  He had the symptoms about 2 or 3 weeks ago while traveling.  Pain is currently a 7 out of 10.  He has some headache/neck discomfort associated with it.  He also notes when the pain is severe he gets more short of breath.  EKG shows some nonspecific ST segment changes.  He is given nitro and aspirin shortly after my evaluation.  He did not note significant improvement of his symptoms which have been waxing and waning.  Initial troponin is elevated.  He was initiated on heparin.  Given the associated shortness of breath additionally, CT scan of his chest was ordered for PE protocol.  CT was negative for pulmonary embolism or dissection.  Given elevated troponin we will plan for admission.  Discussed with the hospitalist agreed accept the patient as an  Admission. Echocardiogram ordered. Cardiology consulted by hospitalist.    Critical Care Time: 35  minutes    Diagnosis:    ICD-10-CM    1. NSTEMI (non-ST elevated myocardial infarction) (H)  I21.4        Scribe Disclosure:  I, José Johnson, am serving as a scribe at 6:09 AM on 9/26/2022 to document services personally performed by Swapnil Terrell MD based on my observations and the provider's statements to me.   I, Dolores Bowen, am serving as a scribe  at 6:42 AM on 9/26/2022.       Swapnil Terrell MD  09/26/22 1043

## 2022-09-26 NOTE — PHARMACY-ADMISSION MEDICATION HISTORY
Pharmacy Medication History  Admission medication history interview status for the 9/26/2022  admission is complete. See EPIC admission navigator for prior to admission medications     Location of Interview: Patient room  Medication history sources: Patient, Surescripts and Care Everywhere    Additional medication history information:   Patient reports no medications on a regular basis.     Medication reconciliation completed by provider prior to medication history? No    Time spent in this activity: 5 min     Prior to Admission medications    Not on File     The information provided in this note is only as accurate as the sources available at the time of update(s)     Sylvia Chery, GaroD, BCPS

## 2022-09-26 NOTE — ED NOTES
[facility]  ED Nurse Handoff Report    ED Chief complaint: Chest Pain      ED Diagnosis:   Final diagnoses:   None       Code Status: Full Code    Allergies: No Known Allergies    Patient Story: Pt is a 57 year old male who presents with chest pain. The patient woke up from his sleep around 4-4:30 am feeling chest pain and tightness everywhere. Along with the chest pain, the patient noticed an abnormal sensation in his left arm and a headache that hurts his neck. The patient states that the chest pain and tightness comes and goes, but when the pain presents it's very serious and uncomfortable. The patient also reports shortness of breath. The patient is still experiencing symptoms currently at the emergency room and rates his pain as a 7/10. He experienced similar symptoms of chest pain about 2 to 3 weeks ago when he was travelling in [place], however he did not visit the hospital back then for those symptoms. The patient denies leg pain or swelling, cough, fever, abdominal pain, nausea, vomiting, diarrhea, sweating, and neck pain. He also denies smoking. Of note, the patient's father recently passed away at the age of 85 from heart issues and the father had a cardiac bypass at around the age of 65 to 67.            Focused Assessment:  C/o of chest pain.     Treatments and/or interventions provided: Labs, Chest xray, Medications       Patient's response to treatments and/or interventions: Tolerating well     To be done/followed up on inpatient unit:      Does this patient have any cognitive concerns?: AOX4    Activity level - Baseline/Home:  Independent  Activity Level - Current:   Unknown    Patient's Preferred language: English   Needed?: No    Isolation: None  Infection:  Patient tested for COVID 19 prior to admission: YES  Bariatric?: No    Vital Signs:   Vitals:    09/26/22 0546 09/26/22 0605   BP: (!) 151/108    Pulse: 71 67   Resp: 18 8   Temp: 98.1  F (36.7  C)    TempSrc:  "Oral    SpO2: 98% 99%   Weight: 71.7 kg (158 lb)    Height: 1.803 m (5' 11\")        Cardiac Rhythm:     Was the PSS-3 completed:   Yes  What interventions are required if any?               Family Comments: Wife at bedside   OBS brochure/video discussed/provided to patient/family: No              Name of person given brochure if not patient:               Relationship to patient:     For the majority of the shift this patient's behavior was Green.   Behavioral interventions performed were .    ED NURSE PHONE NUMBER: [id number]         "

## 2022-09-26 NOTE — Clinical Note
The first balloon was inserted into the left anterior descending.Max pressure = 8 azalea. Total duration = 12 seconds.

## 2022-09-26 NOTE — ED TRIAGE NOTES
States 0445 awakened by C/P 9/10 with some SOB and neck pain.      Triage Assessment     Row Name 09/26/22 0543       Triage Assessment (Adult)    Airway WDL WDL       Respiratory WDL    Respiratory WDL X  states some SOB       Skin Circulation/Temperature WDL    Skin Circulation/Temperature WDL WDL       Cardiac WDL    Cardiac WDL X;chest pain       Chest Pain Assessment    Chest Pain Location midsternal    Chest Pain Radiation neck       Peripheral/Neurovascular WDL    Peripheral Neurovascular WDL WDL       Cognitive/Neuro/Behavioral WDL    Cognitive/Neuro/Behavioral WDL WDL

## 2022-09-26 NOTE — Clinical Note
Potential access sites were evaluated for patency using ultrasound.   The right radial artery were selected. Access was obtained under with Sonosite and Fluoroscopic guidance using a micropuncture 21 gauge needle with direct visualization of needle entry.

## 2022-09-26 NOTE — Clinical Note
The first balloon was inserted into the left anterior descending.Max pressure = 8 azalea. Total duration = 12 seconds.     Max pressure = 12 azalea. Total duration = 12 seconds.    Balloon reinflated a second time: Max pressure = 12 azalea. Total duration = 12 seconds.

## 2022-09-26 NOTE — Clinical Note
Max pressure = 14 azalea. Total duration = 14 seconds.     Max pressure = 14 azalea. Total duration = 14 seconds.    Balloon reinflated a second time: Max pressure = 14 azalea. Total duration = 14 seconds.

## 2022-09-26 NOTE — Clinical Note
Max pressure = 12 azalea. Total duration = 10 seconds. Balloon reinflated a second time: Max pressure = 11 azalea. Total duration = 10 seconds.

## 2022-09-27 ENCOUNTER — APPOINTMENT (OUTPATIENT)
Dept: CARDIOLOGY | Facility: CLINIC | Age: 57
DRG: 247 | End: 2022-09-27
Attending: INTERNAL MEDICINE

## 2022-09-27 LAB
ACT BLD: 122 SECONDS (ref 74–150)
ACT BLD: 250 SECONDS (ref 74–150)
ACT BLD: 535 SECONDS (ref 74–150)
ANION GAP SERPL CALCULATED.3IONS-SCNC: 6 MMOL/L (ref 3–14)
ATRIAL RATE - MUSE: 69 BPM
BUN SERPL-MCNC: 16 MG/DL (ref 7–30)
CALCIUM SERPL-MCNC: 8.6 MG/DL (ref 8.5–10.1)
CHLORIDE BLD-SCNC: 106 MMOL/L (ref 94–109)
CO2 SERPL-SCNC: 27 MMOL/L (ref 20–32)
CREAT SERPL-MCNC: 1.09 MG/DL (ref 0.66–1.25)
DIASTOLIC BLOOD PRESSURE - MUSE: NORMAL MMHG
GFR SERPL CREATININE-BSD FRML MDRD: 80 ML/MIN/1.73M2
GLUCOSE BLD-MCNC: 132 MG/DL (ref 70–99)
HBA1C MFR BLD: 5.5 % (ref 0–5.6)
INTERPRETATION ECG - MUSE: NORMAL
LVEF ECHO: NORMAL
P AXIS - MUSE: 66 DEGREES
POTASSIUM BLD-SCNC: 4.2 MMOL/L (ref 3.4–5.3)
PR INTERVAL - MUSE: 138 MS
QRS DURATION - MUSE: 92 MS
QT - MUSE: 400 MS
QTC - MUSE: 428 MS
R AXIS - MUSE: 57 DEGREES
SODIUM SERPL-SCNC: 139 MMOL/L (ref 133–144)
SYSTOLIC BLOOD PRESSURE - MUSE: NORMAL MMHG
T AXIS - MUSE: -32 DEGREES
TSH SERPL DL<=0.005 MIU/L-ACNC: 0.89 MU/L (ref 0.4–4)
VENTRICULAR RATE- MUSE: 69 BPM

## 2022-09-27 PROCEDURE — 250N000013 HC RX MED GY IP 250 OP 250 PS 637: Performed by: INTERNAL MEDICINE

## 2022-09-27 PROCEDURE — 99232 SBSQ HOSP IP/OBS MODERATE 35: CPT | Performed by: HOSPITALIST

## 2022-09-27 PROCEDURE — 258N000003 HC RX IP 258 OP 636: Performed by: INTERNAL MEDICINE

## 2022-09-27 PROCEDURE — 272N000001 HC OR GENERAL SUPPLY STERILE: Performed by: INTERNAL MEDICINE

## 2022-09-27 PROCEDURE — 210N000002 HC R&B HEART CARE

## 2022-09-27 PROCEDURE — 92928 PRQ TCAT PLMT NTRAC ST 1 LES: CPT | Mod: LD | Performed by: INTERNAL MEDICINE

## 2022-09-27 PROCEDURE — 84443 ASSAY THYROID STIM HORMONE: CPT | Performed by: PHYSICIAN ASSISTANT

## 2022-09-27 PROCEDURE — C1887 CATHETER, GUIDING: HCPCS | Performed by: INTERNAL MEDICINE

## 2022-09-27 PROCEDURE — 80048 BASIC METABOLIC PNL TOTAL CA: CPT | Performed by: INTERNAL MEDICINE

## 2022-09-27 PROCEDURE — C1769 GUIDE WIRE: HCPCS | Performed by: INTERNAL MEDICINE

## 2022-09-27 PROCEDURE — 36415 COLL VENOUS BLD VENIPUNCTURE: CPT | Performed by: INTERNAL MEDICINE

## 2022-09-27 PROCEDURE — 027034Z DILATION OF CORONARY ARTERY, ONE ARTERY WITH DRUG-ELUTING INTRALUMINAL DEVICE, PERCUTANEOUS APPROACH: ICD-10-PCS | Performed by: INTERNAL MEDICINE

## 2022-09-27 PROCEDURE — 99152 MOD SED SAME PHYS/QHP 5/>YRS: CPT | Performed by: INTERNAL MEDICINE

## 2022-09-27 PROCEDURE — 93306 TTE W/DOPPLER COMPLETE: CPT | Mod: 26 | Performed by: INTERNAL MEDICINE

## 2022-09-27 PROCEDURE — C1874 STENT, COATED/COV W/DEL SYS: HCPCS | Performed by: INTERNAL MEDICINE

## 2022-09-27 PROCEDURE — 99207 PR SC NO CHARGE VISIT: CPT | Performed by: INTERNAL MEDICINE

## 2022-09-27 PROCEDURE — 99233 SBSQ HOSP IP/OBS HIGH 50: CPT | Mod: 25 | Performed by: INTERNAL MEDICINE

## 2022-09-27 PROCEDURE — 85347 COAGULATION TIME ACTIVATED: CPT

## 2022-09-27 PROCEDURE — 258N000003 HC RX IP 258 OP 636: Performed by: PHYSICIAN ASSISTANT

## 2022-09-27 PROCEDURE — 99153 MOD SED SAME PHYS/QHP EA: CPT | Performed by: INTERNAL MEDICINE

## 2022-09-27 PROCEDURE — C9600 PERC DRUG-EL COR STENT SING: HCPCS | Performed by: INTERNAL MEDICINE

## 2022-09-27 PROCEDURE — 250N000009 HC RX 250: Performed by: INTERNAL MEDICINE

## 2022-09-27 PROCEDURE — 93005 ELECTROCARDIOGRAM TRACING: CPT

## 2022-09-27 PROCEDURE — 255N000002 HC RX 255 OP 636: Performed by: INTERNAL MEDICINE

## 2022-09-27 PROCEDURE — 999N000208 ECHOCARDIOGRAM COMPLETE

## 2022-09-27 PROCEDURE — 250N000011 HC RX IP 250 OP 636: Performed by: INTERNAL MEDICINE

## 2022-09-27 PROCEDURE — C1894 INTRO/SHEATH, NON-LASER: HCPCS | Performed by: INTERNAL MEDICINE

## 2022-09-27 PROCEDURE — C1725 CATH, TRANSLUMIN NON-LASER: HCPCS | Performed by: INTERNAL MEDICINE

## 2022-09-27 PROCEDURE — 93010 ELECTROCARDIOGRAM REPORT: CPT | Performed by: INTERNAL MEDICINE

## 2022-09-27 PROCEDURE — 250N000013 HC RX MED GY IP 250 OP 250 PS 637: Performed by: PHYSICIAN ASSISTANT

## 2022-09-27 DEVICE — STENT CORONARY DES SYNERGY XD MR US 3.50X16MM H7493941816350: Type: IMPLANTABLE DEVICE | Status: FUNCTIONAL

## 2022-09-27 RX ORDER — NALOXONE HYDROCHLORIDE 0.4 MG/ML
0.4 INJECTION, SOLUTION INTRAMUSCULAR; INTRAVENOUS; SUBCUTANEOUS
Status: ACTIVE | OUTPATIENT
Start: 2022-09-27 | End: 2022-09-28

## 2022-09-27 RX ORDER — ONDANSETRON 2 MG/ML
4 INJECTION INTRAMUSCULAR; INTRAVENOUS EVERY 6 HOURS PRN
Status: DISCONTINUED | OUTPATIENT
Start: 2022-09-27 | End: 2022-09-28 | Stop reason: HOSPADM

## 2022-09-27 RX ORDER — NITROGLYCERIN 0.4 MG/1
0.4 TABLET SUBLINGUAL EVERY 5 MIN PRN
Status: DISCONTINUED | OUTPATIENT
Start: 2022-09-27 | End: 2022-09-28 | Stop reason: HOSPADM

## 2022-09-27 RX ORDER — HYDRALAZINE HYDROCHLORIDE 20 MG/ML
10 INJECTION INTRAMUSCULAR; INTRAVENOUS EVERY 4 HOURS PRN
Status: DISCONTINUED | OUTPATIENT
Start: 2022-09-27 | End: 2022-09-28 | Stop reason: HOSPADM

## 2022-09-27 RX ORDER — HEPARIN SODIUM 1000 [USP'U]/ML
INJECTION, SOLUTION INTRAVENOUS; SUBCUTANEOUS
Status: DISCONTINUED | OUTPATIENT
Start: 2022-09-27 | End: 2022-09-27 | Stop reason: HOSPADM

## 2022-09-27 RX ORDER — ASPIRIN 81 MG/1
243 TABLET, CHEWABLE ORAL ONCE
Status: COMPLETED | OUTPATIENT
Start: 2022-09-27 | End: 2022-09-27

## 2022-09-27 RX ORDER — FLUMAZENIL 0.1 MG/ML
0.2 INJECTION, SOLUTION INTRAVENOUS
Status: ACTIVE | OUTPATIENT
Start: 2022-09-27 | End: 2022-09-28

## 2022-09-27 RX ORDER — NITROGLYCERIN 5 MG/ML
VIAL (ML) INTRAVENOUS
Status: DISCONTINUED | OUTPATIENT
Start: 2022-09-27 | End: 2022-09-27 | Stop reason: HOSPADM

## 2022-09-27 RX ORDER — POTASSIUM CHLORIDE 1500 MG/1
20 TABLET, EXTENDED RELEASE ORAL
Status: DISCONTINUED | OUTPATIENT
Start: 2022-09-27 | End: 2022-09-27 | Stop reason: HOSPADM

## 2022-09-27 RX ORDER — METOPROLOL TARTRATE 1 MG/ML
5 INJECTION, SOLUTION INTRAVENOUS
Status: DISCONTINUED | OUTPATIENT
Start: 2022-09-27 | End: 2022-09-28 | Stop reason: HOSPADM

## 2022-09-27 RX ORDER — SODIUM CHLORIDE 9 MG/ML
INJECTION, SOLUTION INTRAVENOUS CONTINUOUS
Status: DISCONTINUED | OUTPATIENT
Start: 2022-09-27 | End: 2022-09-27 | Stop reason: HOSPADM

## 2022-09-27 RX ORDER — VERAPAMIL HYDROCHLORIDE 2.5 MG/ML
INJECTION, SOLUTION INTRAVENOUS
Status: DISCONTINUED | OUTPATIENT
Start: 2022-09-27 | End: 2022-09-27 | Stop reason: HOSPADM

## 2022-09-27 RX ORDER — ONDANSETRON 4 MG/1
4 TABLET, ORALLY DISINTEGRATING ORAL EVERY 6 HOURS PRN
Status: DISCONTINUED | OUTPATIENT
Start: 2022-09-27 | End: 2022-09-28 | Stop reason: HOSPADM

## 2022-09-27 RX ORDER — FENTANYL CITRATE 50 UG/ML
INJECTION, SOLUTION INTRAMUSCULAR; INTRAVENOUS
Status: DISCONTINUED | OUTPATIENT
Start: 2022-09-27 | End: 2022-09-27 | Stop reason: HOSPADM

## 2022-09-27 RX ORDER — LORAZEPAM 2 MG/ML
0.5 INJECTION INTRAMUSCULAR
Status: DISCONTINUED | OUTPATIENT
Start: 2022-09-27 | End: 2022-09-27 | Stop reason: HOSPADM

## 2022-09-27 RX ORDER — LORAZEPAM 0.5 MG/1
0.5 TABLET ORAL
Status: DISCONTINUED | OUTPATIENT
Start: 2022-09-27 | End: 2022-09-27 | Stop reason: HOSPADM

## 2022-09-27 RX ORDER — NALOXONE HYDROCHLORIDE 0.4 MG/ML
0.2 INJECTION, SOLUTION INTRAMUSCULAR; INTRAVENOUS; SUBCUTANEOUS
Status: ACTIVE | OUTPATIENT
Start: 2022-09-27 | End: 2022-09-28

## 2022-09-27 RX ORDER — ASPIRIN 81 MG/1
81 TABLET, CHEWABLE ORAL ONCE
Status: DISCONTINUED | OUTPATIENT
Start: 2022-09-27 | End: 2022-09-27

## 2022-09-27 RX ORDER — ASPIRIN 325 MG
325 TABLET ORAL ONCE
Status: DISCONTINUED | OUTPATIENT
Start: 2022-09-27 | End: 2022-09-27 | Stop reason: ALTCHOICE

## 2022-09-27 RX ORDER — ASPIRIN 81 MG/1
81 TABLET ORAL DAILY
Status: DISCONTINUED | OUTPATIENT
Start: 2022-09-28 | End: 2022-09-28 | Stop reason: HOSPADM

## 2022-09-27 RX ORDER — ATROPINE SULFATE 0.1 MG/ML
0.5 INJECTION INTRAVENOUS
Status: ACTIVE | OUTPATIENT
Start: 2022-09-27 | End: 2022-09-28

## 2022-09-27 RX ORDER — ACETAMINOPHEN 325 MG/1
650 TABLET ORAL EVERY 4 HOURS PRN
Status: DISCONTINUED | OUTPATIENT
Start: 2022-09-27 | End: 2022-09-28 | Stop reason: HOSPADM

## 2022-09-27 RX ORDER — FENTANYL CITRATE 50 UG/ML
25 INJECTION, SOLUTION INTRAMUSCULAR; INTRAVENOUS
Status: DISCONTINUED | OUTPATIENT
Start: 2022-09-27 | End: 2022-09-28 | Stop reason: HOSPADM

## 2022-09-27 RX ORDER — SODIUM CHLORIDE 9 MG/ML
INJECTION, SOLUTION INTRAVENOUS CONTINUOUS
Status: ACTIVE | OUTPATIENT
Start: 2022-09-27 | End: 2022-09-27

## 2022-09-27 RX ORDER — LIDOCAINE 40 MG/G
CREAM TOPICAL
Status: DISCONTINUED | OUTPATIENT
Start: 2022-09-27 | End: 2022-09-27

## 2022-09-27 RX ORDER — IOPAMIDOL 755 MG/ML
INJECTION, SOLUTION INTRAVASCULAR
Status: DISCONTINUED | OUTPATIENT
Start: 2022-09-27 | End: 2022-09-27 | Stop reason: HOSPADM

## 2022-09-27 RX ADMIN — TICAGRELOR 90 MG: 90 TABLET ORAL at 21:28

## 2022-09-27 RX ADMIN — TICAGRELOR 90 MG: 90 TABLET ORAL at 09:06

## 2022-09-27 RX ADMIN — SODIUM CHLORIDE: 9 INJECTION, SOLUTION INTRAVENOUS at 16:30

## 2022-09-27 RX ADMIN — SODIUM CHLORIDE: 9 INJECTION, SOLUTION INTRAVENOUS at 12:47

## 2022-09-27 RX ADMIN — ASPIRIN 81 MG CHEWABLE TABLET 243 MG: 81 TABLET CHEWABLE at 11:36

## 2022-09-27 RX ADMIN — ROSUVASTATIN CALCIUM 20 MG: 20 TABLET, FILM COATED ORAL at 09:06

## 2022-09-27 RX ADMIN — ASPIRIN 81 MG: 81 TABLET, COATED ORAL at 09:06

## 2022-09-27 RX ADMIN — HUMAN ALBUMIN MICROSPHERES AND PERFLUTREN 9 ML: 10; .22 INJECTION, SOLUTION INTRAVENOUS at 10:23

## 2022-09-27 ASSESSMENT — ACTIVITIES OF DAILY LIVING (ADL)
ADLS_ACUITY_SCORE: 18

## 2022-09-27 NOTE — CONSULTS
"Patient stated to Admissions that he has a \"medi-share plan\" but did not identify which one.    Patient will pay full price at the pharmacy, and be reimbursed in part, in full, or not at all by the plan, depending on its parameters.      I would recommend patient download a copay savings card from [employer] in lieu of using any \"discount card\" offered by the medi-share plan.  The copay savings card can be used on an ongoing basis to reduce the cost of Brilinta from $447/mo to $327/mo.  The $327 can submitted to the plan for reimbursement if it has a prescription sharing provision.  Discharge Pharmacy can fill 1 mo free.    Clopidogrel is $8/mo using the Singlecare discount card.    Sandra Nieves  Pharmacy Technician/Liaison, Discharge Pharmacy   [phone]  [email or web address]    "

## 2022-09-27 NOTE — PLAN OF CARE
Goal Outcome Evaluation:    Plan of Care Reviewed With: patient      A+Ox4. Back from angio at 1645. Right TR band at 14 cc. Below that band hematoma, pressure held and band moved down with Cath lab RN. VSS on RA. No pain. SR.

## 2022-09-27 NOTE — PROGRESS NOTES
[facility]  Cardiology Progress Note    Date of Service (when I saw the patient): 09/27/2022  Primary Cardiologist: No previous cardiology care       Interval History:   No new issues this morning.  He had breakfast at 8:30 AM this morning but will be n.p.o. now.    ----------------------------------------------------------------------------------------    Assessment:  Jorge Thomas is a 57 year old male who was admitted on 9/26/2022 NSTEMI.     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; s/p successful PCI of OM1 with 1 ILANA  -He has residual 70% proximal LAD as well as 70% mid RCA lesions  -On DAPT in the form of aspirin and Brilinta  -Initiated on high intensity statin therapy (baseline LDL of 94); not on beta-blocker due to resting bradycardia  -Echocardiogram pending    # Family history of premature CAD      ----------------------------------------------------------------------------------------    Plan:  -Plan for staged PCI of LAD today (patient unfortunately had breakfast at 8:30 AM; Cath Lab notified and will will plan for late afternoon coronary angiogram; consent obtained and risks and benefits discussed; renal function normal based on BMP today)  - Echocardiogram today  - Defer cardiac rehab until after staged PCI  - Check TSH and hemoglobin A1c  - Cardiology will follow      ----------------------------------------------------------------------------------------  Physical Exam   Temp: 98.4  F (36.9  C) Temp src: Oral BP: 104/76 Pulse: 85   Resp: 16 SpO2: 97 % O2 Device: None (Room air)    Vitals:    09/26/22 0546 09/26/22 1324 09/27/22 0628   Weight: 71.7 kg (158 lb) 73.5 kg (162 lb 1.6 oz) 73.1 kg (161 lb 3.2 oz)     GEN: NAD. Oxygen on room air.   HEENT: Mucous membranes moist.  NECK:  No JVD.  C/V:  Regular rate and rhythm, no murmur, rub or gallop.   RESP: Respirations are unlabored. No use of accessory muscles. Clear to  auscultation bilaterally without wheezing, rales, or rhonchi.  GI: Abdomen soft, nontender, nondistended.    EXTREM: No pitting LE edema.   NEURO: Alert and oriented, cooperative.   PSYCH: Normal affect.  SKIN: Warm and dry.   VASC: 2+ radial and dorsalis pedis pulses bilaterally.      Medications     Percutaneous Coronary Intervention orders placed (this is information for BPA alerting)       BETA BLOCKER NOT PRESCRIBED       sodium chloride 50 mL/hr at 09/26/22 1035       aspirin  81 mg Oral Daily     rosuvastatin  20 mg Oral Daily     ticagrelor  90 mg Oral Q12H       Data   Reviewed.     Capo Allen PA-C   9/27/2022  Pager: [phone]

## 2022-09-27 NOTE — PLAN OF CARE
Goal Outcome Evaluation:    Plan of Care Reviewed With: patient, spouse     Overall Patient Progress: improving    [facility] Nursing Note    Patient Information  Name: Jorge Thomas  Age: 57 year old    Assessment  Orientation/Neuro: Alert and Oriented x4  Cardiac/Tele: NSR  Resp: WDL no shortness of breath after cath lab, some SOB prior  GI/: WDL No nausea, vomiting, abdominal pain, diarrhea, constipation or change in bowel habits  CMS: WDL after TR band removal  Mobility: walks with assist with TR band, independent prior  Pain: denies pain after cath lab, prior to cath lab 2/10 pain that did not worsen   Diet: Orders Placed This Encounter      Low Saturated Fat Na <2400 mg      NPO for Medical/Clinical Reasons Except for: Meds    Vital Signs  B/P: 102/65, T: 98.4, P: 72, R: 18, O2: 99 on RA    Plan  Echocardiogram and cardiac rehab tomorrow, pt will need OP angiogram for other vessel disease    Kelsey Starks RN

## 2022-09-27 NOTE — PROGRESS NOTES
09/27/2022 [id number]    Pt admitted on 09/26 for CP. NSTEMI. Had angiogram yesterday showing multivessel CAD. 1 stent placed. Plan for second angiogram today at 1500. VSS on RA. A&Ox4. Independent in room. Denies pain. Continent. Ambulating to BR. Tele: . Echo completed today. r rad site CDI. K+: 4.2. NPO for angiogram.     Pt in cath lab for angiogram.

## 2022-09-27 NOTE — PROGRESS NOTES
[facility]    Hospitalist Progress Note    Date of Admission:  9/26/2022    Assessment & Plan     Jorge Thomas is a 57 year old male without prior cardiac history who presented to the [facility] ER with chest discomfort.     NSTEMI  Multivessel CAD  Summary:  56 y/o male presented with chest discomfort and associated mildly elevated troponin.  Also had a similar episode of chest discomfort about 2 weeks ago he did not seek eval for.  Chest imaging negative for PE/pneumonia.  He has incidentally noted coronary calcifications.  Troponin went from 136 to 995. ECG showed sinus rhythm with inferolateral ST depression  -  Admitted to CCU and patient underwent urgent coronary angiogram that showed subtotal occlusion of the first obtuse marginal which was successfully stented with reestablishment of flow.   He also has residual complex proximal LAD lesion and a 60 to 70% mid right coronary artery lesion.     Plan to undergo staged PCI to mid LAD lesion today.  Per cardiology: Downstream, optimization of GDMT, stress test in 6 to 8 weeks and if he has inferior ischemia or ongoing symptoms consider staged PCI to the right coronary artery.   -Continue DAPT with aspirin and Brilinta  -Initiated on high intensity statin  -Not on beta-blocker due to resting bradycardia  -TTE showed EF 60 to 65%, hypokinesis of mid-basal lateral and inferolateral walls of LV, no significant valvular disease.    -As needed sublingual nitroglycerin available  -IV heparin drip has been discontinued  -A1c 5.5.  LDL 94.    History of melanoma:  -  No recent issues, diagnosed over 5 years ago.  Follows for routine skin monitoring.     COVID-19 PCR negative at admission     DVT Prophylaxis: Pneumatic Compression Devices   Code Status: Full Code  Rubio Catheter: Not present  Central Lines: None  Cardiac Monitoring: None     Disposition:  Possibly discharge home tomorrow              Clinically Significant Risk Factors Present on  Admission                       Katrina Colunga MD  Text Page (7am - 6pm, M-F)  [facility]  Securely message with the Vocera Web Console (learn more here)  Text page via [employer] Paging/Directory      Interval History   Stable overnight.  No chest pain or shortness of breath this morning.  Awaiting cardiac catheterization later today.    -Data reviewed today: I reviewed all new labs and imaging results over the last 24 hours. I personally reviewed EKG with result as noted above    Physical Exam   Temp: 98.4  F (36.9  C) Temp src: Oral BP: 104/76 Pulse: 85   Resp: 16 SpO2: 97 % O2 Device: Nasal cannula Oxygen Delivery: 2 LPM  Vitals:    09/26/22 0546 09/26/22 1324 09/27/22 0628   Weight: 71.7 kg (158 lb) 73.5 kg (162 lb 1.6 oz) 73.1 kg (161 lb 3.2 oz)     Vital Signs with Ranges  Temp:  [98.4  F (36.9  C)-99  F (37.2  C)] 98.4  F (36.9  C)  Pulse:  [70-87] 85  Resp:  [16-18] 16  BP: ()/(62-88) 104/76  SpO2:  [96 %-100 %] 97 %  I/O last 3 completed shifts:  In: 480 [P.O.:480]  Out: -     Constitutional: Alert, appears comfortable, in no acute distress  Respiratory: Non labored breathing, clear to auscultation bilaterally, no crackles or wheezes  Cardiovascular: Heart sounds regular rate and rhythm, no murmurs, no leg edema  GI: Abdomen is soft, non distended, non tender. Normal BS  Skin/Integumen: no rashes, no pressure sores  Neuro: alert, converses appropriately, moving all extremities, fluent speech, no facial asymmetry  Psych: mood and affect appropriate      Medications     Percutaneous Coronary Intervention orders placed (this is information for BPA alerting)       BETA BLOCKER NOT PRESCRIBED       sodium chloride 150 mL/hr at 09/27/22 1247     sodium chloride 50 mL/hr at 09/26/22 1035       aspirin  81 mg Oral Daily     rosuvastatin  20 mg Oral Daily     sodium chloride (PF)  3 mL Intracatheter Q8H     ticagrelor  90 mg Oral Q12H       Data   Recent Labs   Lab 09/27/22  0582  22  0606   WBC  --  4.8   HGB  --  17.3   MCV  --  91   PLT  --  213    139   POTASSIUM 4.2 3.9   CHLORIDE 106 104   CO2 27 28   BUN 16 22   CR 1.09 1.08   ANIONGAP 6 7   MICHELLE 8.6 9.2   * 137*       Imaging  Recent Results (from the past 24 hour(s))   Echocardiogram Complete   Result Value    LVEF  60-65%    Narrative    [id number]  [id number]  [id number]  [id number]^INO^RYNA^TONE     [facility]  Echocardiography Laboratory  [address] [place] [id number]     Name: TIM GARCIA  MRN: [id number]  : 1965  Study Date: 2022 09:56 AM  Age: 57 yrs  Gender: Male  Patient Location: [facility]  Reason For Study: Chest Pain  Ordering Physician: RYAN MCKINNON  Referring Physician: Blaire Hollins  Performed By: Noe Valentin     BSA: 1.9 m2  Height: 71 in  Weight: 161 lb  HR: 78  BP: 104/76 mmHg  ______________________________________________________________________________  Procedure  Complete Portable Echo Adult. Optison (NDC #[id number]) given intravenously.  ______________________________________________________________________________  Interpretation Summary     The visual ejection fraction is 60-65%.  There is hypokinesis of the mid-basal lateral and inferolateral walls of the  LV.  The right ventricle is normal in size and function.  No hemodynamically significant valvular disease.  ______________________________________________________________________________  Left Ventricle  The left ventricle is normal in structure, function and size. The visual  ejection fraction is 60-65%. Diastolic Doppler findings (E/E' ratio and/or  other parameters) suggest left ventricular filling pressures are normal. There  is hypokinesis of the mid-basal lateral and inferolateral walls of the LV.     Right Ventricle  The right ventricle is normal in size and function.     Atria  Normal left atrial size. Right atrial size is normal.     Mitral Valve  The mitral valve is normal in structure  and function. There is trace mitral  regurgitation.     Tricuspid Valve  The tricuspid valve is not well visualized, but is grossly normal. Right  ventricular systolic pressure could not be approximated due to inadequate  tricuspid regurgitation.     Aortic Valve  The aortic valve is normal in structure and function. No hemodynamically  significant valvular aortic stenosis.     Pulmonic Valve  The pulmonic valve is not well visualized. There is trace to mild pulmonic  valvular regurgitation.     Vessels  The aortic root is normal size. The inferior vena cava was normal in size with  preserved respiratory variability.     Pericardium  Trivial anterior pericardial effusion.     ______________________________________________________________________________  MMode/2D Measurements & Calculations  RVDd: 3.4 cm  IVSd: 0.64 cm  LVIDd: 4.2 cm  LVIDs: 2.8 cm  LVPWd: 0.85 cm  FS: 34.0 %  LV mass(C)d: 91.7 grams  LV mass(C)dI: 47.7 grams/m2     Ao root diam: 3.2 cm  LA dimension: 3.1 cm  asc Aorta Diam: 3.1 cm  LA/Ao: 0.97  LVOT diam: 2.4 cm  LVOT area: 4.4 cm2  LA Volume (BP): 54.4 ml  LA Volume Index (BP): 28.3 ml/m2  RWT: 0.40     Doppler Measurements & Calculations  MV E max ana: 65.5 cm/sec  MV A max ana: 75.0 cm/sec  MV E/A: 0.87  MV dec slope: 268.2 cm/sec2  MV dec time: 0.25 sec     LV V1 max PG: 3.3 mmHg  LV V1 max: 90.4 cm/sec  LV V1 VTI: 20.6 cm  SV(LVOT): 90.0 ml  SI(LVOT): 46.8 ml/m2  PA acc time: 0.13 sec  PI end-d ana: 54.7 cm/sec  E/E' av.9  Lateral E/e': 5.3  Medial E/e': 8.5     ______________________________________________________________________________  Report approved by: Kimi Royal 2022 11:40 AM

## 2022-09-27 NOTE — PLAN OF CARE
A&OX4,Temp max 99,  RA, Tele- SR. Up independent, denies pain. Right radial C/D/I CMS intact.

## 2022-09-27 NOTE — PRE-PROCEDURE
GENERAL PRE-PROCEDURE:   Procedure:  Cor angio staged PCI  Date/Time:  9/27/2022 3:36 PM    Verbal consent obtained?: Yes    Written consent obtained?: Yes    Risks and benefits: Risks, benefits and alternatives were discussed    Consent given by:  Patient  Patient states understanding of procedure being performed: Yes    Patient's understanding of procedure matches consent: Yes    Procedure consent matches procedure scheduled: Yes    Expected level of sedation:  Moderate  Appropriately NPO:  Yes  Mallampati  :  Grade 1- soft palate, uvula, tonsillar pillars, and posterior pharyngeal wall visible  Lungs:  Lungs clear with good breath sounds bilaterally  Heart:  Normal heart sounds and rate  History & Physical reviewed:  History and physical reviewed and no updates needed  Statement of review:  I have reviewed the lab findings, diagnostic data, medications, and the plan for sedation

## 2022-09-27 NOTE — CONSULTS
Consulted for  diet education post heart cath.   - Chart reviewed, plan return to the cath lab this afternoon.   - RD will plan to check in for education tomorrow.     Merle Poole RD, LD

## 2022-09-28 ENCOUNTER — APPOINTMENT (OUTPATIENT)
Dept: OCCUPATIONAL THERAPY | Facility: CLINIC | Age: 57
DRG: 247 | End: 2022-09-28
Attending: INTERNAL MEDICINE

## 2022-09-28 VITALS
RESPIRATION RATE: 13 BRPM | HEART RATE: 101 BPM | HEIGHT: 71 IN | BODY MASS INDEX: 22.61 KG/M2 | SYSTOLIC BLOOD PRESSURE: 122 MMHG | DIASTOLIC BLOOD PRESSURE: 70 MMHG | OXYGEN SATURATION: 98 % | WEIGHT: 161.5 LBS | TEMPERATURE: 97.8 F

## 2022-09-28 LAB
ANION GAP SERPL CALCULATED.3IONS-SCNC: 6 MMOL/L (ref 3–14)
APO A-I SERPL-MCNC: 8 MG/DL
ATRIAL RATE - MUSE: 63 BPM
BUN SERPL-MCNC: 17 MG/DL (ref 7–30)
CALCIUM SERPL-MCNC: 9.1 MG/DL (ref 8.5–10.1)
CHLORIDE BLD-SCNC: 107 MMOL/L (ref 94–109)
CO2 SERPL-SCNC: 26 MMOL/L (ref 20–32)
CREAT SERPL-MCNC: 1.01 MG/DL (ref 0.66–1.25)
DIASTOLIC BLOOD PRESSURE - MUSE: NORMAL MMHG
GFR SERPL CREATININE-BSD FRML MDRD: 88 ML/MIN/1.73M2
GLUCOSE BLD-MCNC: 120 MG/DL (ref 70–99)
INTERPRETATION ECG - MUSE: NORMAL
P AXIS - MUSE: 61 DEGREES
POTASSIUM BLD-SCNC: 4.1 MMOL/L (ref 3.4–5.3)
PR INTERVAL - MUSE: 132 MS
QRS DURATION - MUSE: 86 MS
QT - MUSE: 418 MS
QTC - MUSE: 427 MS
R AXIS - MUSE: 72 DEGREES
SODIUM SERPL-SCNC: 139 MMOL/L (ref 133–144)
SYSTOLIC BLOOD PRESSURE - MUSE: NORMAL MMHG
T AXIS - MUSE: -47 DEGREES
VENTRICULAR RATE- MUSE: 63 BPM

## 2022-09-28 PROCEDURE — 99233 SBSQ HOSP IP/OBS HIGH 50: CPT | Performed by: INTERNAL MEDICINE

## 2022-09-28 PROCEDURE — 93005 ELECTROCARDIOGRAM TRACING: CPT

## 2022-09-28 PROCEDURE — 97110 THERAPEUTIC EXERCISES: CPT | Mod: GO

## 2022-09-28 PROCEDURE — 97165 OT EVAL LOW COMPLEX 30 MIN: CPT | Mod: GO

## 2022-09-28 PROCEDURE — 83695 ASSAY OF LIPOPROTEIN(A): CPT | Performed by: PHYSICIAN ASSISTANT

## 2022-09-28 PROCEDURE — 250N000013 HC RX MED GY IP 250 OP 250 PS 637: Performed by: INTERNAL MEDICINE

## 2022-09-28 PROCEDURE — 99239 HOSP IP/OBS DSCHRG MGMT >30: CPT | Performed by: HOSPITALIST

## 2022-09-28 PROCEDURE — 93010 ELECTROCARDIOGRAM REPORT: CPT | Performed by: INTERNAL MEDICINE

## 2022-09-28 PROCEDURE — 36415 COLL VENOUS BLD VENIPUNCTURE: CPT | Performed by: INTERNAL MEDICINE

## 2022-09-28 PROCEDURE — 97535 SELF CARE MNGMENT TRAINING: CPT | Mod: GO

## 2022-09-28 PROCEDURE — 80048 BASIC METABOLIC PNL TOTAL CA: CPT | Performed by: INTERNAL MEDICINE

## 2022-09-28 RX ORDER — NITROGLYCERIN 0.4 MG/1
TABLET SUBLINGUAL
Qty: 20 TABLET | Refills: 0 | Status: SHIPPED | OUTPATIENT
Start: 2022-09-28

## 2022-09-28 RX ORDER — ROSUVASTATIN CALCIUM 20 MG/1
20 TABLET, COATED ORAL DAILY
Qty: 30 TABLET | Refills: 0 | Status: SHIPPED | OUTPATIENT
Start: 2022-09-29 | End: 2022-10-20

## 2022-09-28 RX ADMIN — TICAGRELOR 90 MG: 90 TABLET ORAL at 08:20

## 2022-09-28 RX ADMIN — ASPIRIN 81 MG: 81 TABLET, COATED ORAL at 08:19

## 2022-09-28 RX ADMIN — ROSUVASTATIN CALCIUM 20 MG: 20 TABLET, FILM COATED ORAL at 08:19

## 2022-09-28 ASSESSMENT — ACTIVITIES OF DAILY LIVING (ADL)
ADLS_ACUITY_SCORE: 18

## 2022-09-28 NOTE — PLAN OF CARE
"A&Ox4. /68 (BP Location: Left arm)   Pulse 77   Temp 98.2  F (36.8  C) (Oral)   Resp 13   Ht 1.803 m (5' 11\")   Wt 73.3 kg (161 lb 8 oz)   SpO2 97%   BMI 22.52 kg/m   Tele SR. Denies pain. R radial site ecchymptic, CDI, CMS intact. Up in room independently. Plan for cardiac rehab today.     "

## 2022-09-28 NOTE — PROGRESS NOTES
09/27/22 1052   Quick Adds   Type of Visit Initial Occupational Therapy Evaluation   Living Environment   People in Home child(connie), dependent;spouse  (14 and 19yo children)   Current Living Arrangements house   Home Accessibility stairs to enter home;stairs within home   Number of Stairs, Main Entrance 2   Stair Railings, Main Entrance none   Number of Stairs, Within Home, Primary greater than 10 stairs   Stair Railings, Within Home, Primary railings safe and in good condition   Transportation Anticipated family or friend will provide;car, drives self   Living Environment Comments 2-story home, full flight to 2nd floor and basement   Self-Care   Usual Activity Tolerance excellent   Regular Exercise Yes   Activity/Exercise Type walking;strength training  (golfing)   Equipment Currently Used at Home none   Fall history within last six months no   Activity/Exercise/Self-Care Comment Indep all ADL, IADL, mob and 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 Emily Santiago   Patient/Family Therapy Goal Statement (OT) return home   Additional Occupational Profile Info/Pertinent History of Current Problem 58yo male admitted 9/26 with chest pain, NSTEMI and underwent emergent angio with PCI of OM1 x 1 ILANA. On 9/27 underwent PCI with ILANA to mid LAD. May possibly have staged PCI to RCA in 6-8 weeks if continues to have sx or inferior ischemia.   Existing Precautions/Restrictions cardiac   Heart Disease Risk Factors Family history;Medical history;Gender   Cognitive Status Examination   Orientation Status orientation to person, place and time   Affect/Mental Status (Cognitive) WNL   Follows Commands WNL   Pain Assessment   Patient Currently in Pain No   Range of Motion Comprehensive   General Range of Motion no range of motion deficits identified   Strength Comprehensive (MMT)   General Manual Muscle Testing  (MMT) Assessment no strength deficits identified   Coordination   Upper Extremity Coordination No deficits were identified   Bed Mobility   Comment (Bed Mobility) Indep   Transfers   Transfer Comments Indep   Balance   Balance Comments Indep   Activities of Daily Living   BADL Assessment/Intervention   (Independent all)   Clinical Impression   Criteria for Skilled Therapeutic Interventions Met (OT) Yes, treatment indicated   OT Diagnosis decline in exercise and IADL task performance at admit   OT Problem List-Impairments impacting ADL problems related to;activity tolerance impaired   Assessment of Occupational Performance 1-3 Performance Deficits   Identified Performance Deficits ambulatory tasks, exercise tasks, household maintenance tasks   Planned Therapy Interventions (OT) ADL retraining;home program guidelines;progressive activity/exercise;risk factor education   Clinical Decision Making Complexity (OT) low complexity   Risk & Benefits of therapy have been explained evaluation/treatment results reviewed;care plan/treatment goals reviewed;risks/benefits reviewed;current/potential barriers reviewed;participants voiced agreement with care plan;participants included;patient   OT Discharge Planning   OT Discharge Recommendation (DC Rec) home with assist;home with outpatient cardiac rehab   OT Rationale for DC Rec Recommend assist with heavier household tasks for 1-2 weeks (per cardiology) and OPCR for progressive monitored exercise and further education in lifestyle management.   OT Brief overview of current status Indep mob and ADLs in room, indep in and tolerating amb around room with stable cardiac response.   OT Goals   Therapy Frequency (OT) 2 times/day   OT Predicted Duration/Target Date for Goal Attainment 09/28/22   OT Goals Cardiac Phase 1   OT: Understanding of cardiac education to maximize quality of life, condition management, and health outcomes Patient;Goal Met   OT: Perform aerobic activity with  stable cardiovascular response 15 minutes;treadmill;20 minutes   OT: Functional/aerobic ambulation tolerance with stable cardiovascular response in order to return to home and community environment Greater than 300 feet;Independent   OT: Navigation of stairs simulating home set up with stable cardiovascular response in order to return to home and community environment Independent;Greater than 10 stairs   Psychosocial Support   Trust Relationship/Rapport care explained;choices provided;questions answered;questions encouraged

## 2022-09-28 NOTE — CONSULTS
NUTRITION EDUCATION    REASON FOR ASSESSMENT:  Nutrition education on American Heart Association (AHA) Heart Healthy Diet.    NUTRITION HISTORY:  Information obtained from chart review, discussion with pt and wife    - PMH of undiagnosed low HDL dyslipidemia (HDL is 28, LDL 94, normal triglycerides, not on lipid-lowering therapy), and a family history of premature coronary artery disease with his father undergoing CABG in his late 50s) and subsequently  at age 85 of unknown reason).   - per discussion with pt, pt doesn't follow a particular diet but tries to eat healthy and watch his sodium intake    CURRENT DIET ORDER:  Cardiac diet    NUTRITION DIAGNOSIS:  Food- and nutrition-related knowledge deficit R/t  No prior formal diet ed     INTERVENTIONS:  Nutrition Prescription:    Recommended AHA Heart Healthy Diet    Implementation:     Nutrition Education (Content):  a) reviewed Heart Healthy Diet guidelines  b) provided heart healthy diet handout    Nutrition Education (Application):  a) Discussed current eating habits and recommended alternative food choices    Anticipate good compliance    Diet Education - refer to Education flowsheet    Goals:    Patient verbalizes understanding of diet by no further questions    All of the above goals met during education session    Follow Up/Monitoring:    Provided RD contact information for future questions      Angelica Kilpatrick RD, LD

## 2022-09-28 NOTE — PROGRESS NOTES
[facility]  Cardiology Progress Note    Date of Service (when I saw the patient): 09/28/2022  Primary Cardiologist: He will establish care with Dr. Guevara       Interval History:   Denies any new issues. No recurrent CP.     ----------------------------------------------------------------------------------------    Assessment:  Jorge Thomas is a 57 year old male who was admitted on 9/26/2022 NSTEMI.     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; s/p successful PCI of OM1 with 1 ILANA  -He has residual 70% proximal LAD as well as 70% mid RCA lesions  --S/p staged PCI to prox LAD with one ILANA; BMP 9/28 shows normal renal fxn   -- TSH and hA1c unremarkable   --Residual moderate RCA lesion with medical therapy at this time  -On DAPT in the form of aspirin and Brilinta  -Initiated on high intensity statin therapy (baseline LDL of 94); not on beta-blocker due to resting bradycardia and Acei due to low BP   -Echocardiogram showed preserved LVEF with no valve abnormalities; there is mid-basal lateral to inferolateral hypokinesis     # Family history of premature CAD      ----------------------------------------------------------------------------------------    Plan:  - Continue with DAPT  -- Outpatient cardiac rehab   -- Cardiology follow up in 2-3 weeks w/ ISMAEL w/ repeat BMP, stress testing in 6-8 weeks for assessment of RCA territory, and follow up w/ Dr. Guevara in 3-4 months w/ repeat lipid panel/ALT  - Patient is OK for discharge after inpatient cardiac rehab     ----------------------------------------------------------------------------------------  Physical Exam   Temp: 97.8  F (36.6  C) Temp src: Oral BP: 116/74 Pulse: 69   Resp: 13 SpO2: 98 % O2 Device: Nasal cannula Oxygen Delivery: 2 LPM  Vitals:    09/26/22 1324 09/27/22 0628 09/28/22 0537   Weight: 73.5 kg (162 lb 1.6 oz) 73.1 kg (161 lb 3.2 oz) 73.3 kg (161 lb 8 oz)     GEN: NAD. Oxygen on  room air.   HEENT: Mucous membranes moist.  NECK:  No JVD.  C/V:  Regular rate and rhythm, no murmur, rub or gallop.   RESP: Respirations are unlabored. No use of accessory muscles. Clear to auscultation bilaterally without wheezing, rales, or rhonchi.  GI: Abdomen soft, nontender, nondistended.    EXTREM: No pitting LE edema.   NEURO: Alert and oriented, cooperative.   PSYCH: Normal affect.  SKIN: Warm and dry.   VASC: 2+ radial and dorsalis pedis pulses bilaterally.      Medications     - MEDICATION INSTRUCTIONS -       Percutaneous Coronary Intervention orders placed (this is information for BPA alerting)       Percutaneous Coronary Intervention orders placed (this is information for BPA alerting)       BETA BLOCKER NOT PRESCRIBED       sodium chloride 50 mL/hr at 09/26/22 1035       aspirin  81 mg Oral Daily     rosuvastatin  20 mg Oral Daily     ticagrelor  90 mg Oral Q12H       Data   Reviewed.     Capo Allen PA-C   9/28/2022  Pager: [phone]

## 2022-09-28 NOTE — PLAN OF CARE
Discharge to home. Will follow up with PCP and cardiology as scheduled. Discharge education/medications complete. Transport arranged with .

## 2022-09-28 NOTE — DISCHARGE SUMMARY
Discharge Summary  Hospitalist    Date of Admission:  9/26/2022  Date of Discharge:  9/28/2022  Discharging Provider: Katrina Colunga MD, MD  Date of Service (when I saw the patient): 09/28/22    Discharge Diagnoses   NSTEMI  Multivessel CAD    History of Present Illness   Please refer H & P for details.      Hospital Course   Jorge Thomas is a 57 year old male without prior cardiac history who presented to the [facility] ER with chest discomfort.     NSTEMI  Multivessel CAD  Summary:  58 y/o male presented with chest discomfort and associated mildly elevated troponin.  Also had a similar episode of chest discomfort about 2 weeks ago he did not seek eval for.  Chest imaging negative for PE/pneumonia.  He has incidentally noted coronary calcifications.  Troponin went from 136 to 995. ECG showed sinus rhythm with inferolateral ST depression  -  Admitted to CCU and patient underwent urgent coronary angiogram that showed subtotal occlusion of the first obtuse marginal which was successfully stented with reestablishment of flow.   He also has residual complex proximal LAD lesion and a 60 to 70% mid right coronary artery lesion.   He underwent staged PCI to proximal LAD with ILANA..  Per cardiology: Downstream, optimization of GDMT, stress test in 6 to 8 weeks and if he has inferior ischemia or ongoing symptoms consider staged PCI to the right coronary artery.   -Continue DAPT with aspirin and Brilinta  -Initiated on high intensity statin  -Not on beta-blocker due to resting bradycardia  -TTE showed EF 60 to 65%, hypokinesis of mid-basal lateral and inferolateral walls of LV, no significant valvular disease.    -As needed sublingual nitroglycerin available  -IV heparin drip has been discontinued  -A1c 5.5.  LDL 94.   - Outpatient cardiac rehab   - Cardiology follow up in 2-3 weeks w/ ISMAEL w/ repeat BMP, stress testing in 6-8 weeks for assessment of RCA territory, and follow up w/ Dr. Guevara in 3-4 months w/ repeat lipid  panel/ALT  -Patient is discharged home in stable condition on 9/28.    History of melanoma:  -  No recent issues, diagnosed over 5 years ago.  Follows for routine skin monitoring.     COVID-19 PCR negative at admission      Katrina Colunga MD, MD      Pending Results   These results will be followed up by Hospitalist team.  Unresulted Labs Ordered in the Past 30 Days of this Admission     Date and Time Order Name Status Description    9/28/2022 11:23 AM Lipoprotein (a) In process           Code Status   Full Code       Primary Care Physician   Blaire Hollins    Follow-ups Needed After Discharge   Follow-up Appointments     Follow-up and recommended labs and tests       Follow up with primary care provider, Blaire Hollins, within 7 days for   hospital follow- up.  No follow up labs or test are needed.  Follow up with Outpatient cardiac rehab.  Cardiology follow up in 2-3 weeks w/ ISMAEL w/ repeat BMP, stress testing in   6-8 weeks for assessment of RCA territory, and follow up w/ Dr. Guevara in   3-4 months w/ repeat lipid panel/ALT             Physical Exam   Temp: 97.8  F (36.6  C) Temp src: Oral BP: 122/70 Pulse: 101 (max during treadmill activity)   Resp: 13 SpO2: 98 % O2 Device: Nasal cannula Oxygen Delivery: 2 LPM  Vitals:    09/26/22 1324 09/27/22 0628 09/28/22 0537   Weight: 73.5 kg (162 lb 1.6 oz) 73.1 kg (161 lb 3.2 oz) 73.3 kg (161 lb 8 oz)     Vital Signs with Ranges  Temp:  [97.8  F (36.6  C)-98.5  F (36.9  C)] 97.8  F (36.6  C)  Pulse:  [] 101  Resp:  [13-16] 13  BP: (101-122)/(55-95) 122/70  SpO2:  [97 %-98 %] 98 %  I/O last 3 completed shifts:  In: 720 [P.O.:720]  Out: -     Constitutional: Alert, cooperative, no apparent distress  Respiratory: Non labored breathing, clear to auscultation bilaterally, no crackles or wheezing  Cardiovascular: Regular rate and rhythm, no murmurs, no edema  GI: Normal bowel sounds, soft, non-distended, non-tender  Skin: No obvious rash  Neuro: Alert, engages in  appropriate conversation, fluent speech, moving all extremities, no facial asymmetry  Psych: Calm and pleasant, no obvious anxiety/ depression      Discharge Disposition   Discharged to home  Condition at discharge: Stable    Consultations This Hospital Stay   PHARMACY IP CONSULT  PHARMACY IP CONSULT  CARDIOLOGY IP CONSULT  PHARMACY IP CONSULT  NUTRITION SERVICES ADULT IP CONSULT  CARDIAC REHAB IP CONSULT  PHARMACY IP CONSULT  PHARMACY LIAISON FOR MEDICATION COVERAGE CONSULT  NUTRITION SERVICES ADULT IP CONSULT  CARDIAC REHAB IP CONSULT  PHARMACY IP CONSULT  SMOKING CESSATION PROGRAM IP CONSULT  SMOKING CESSATION PROGRAM IP CONSULT    Time Spent on this Encounter   Katrina ADAME MD, personally saw the patient today and spent greater than 30 minutes discharging this patient.    Discharge Orders      Basic metabolic panel     Lipid Profile     ALT     Lipoprotein (a)     CARDIAC REHAB REFERRAL      Follow-Up with Cardiology ISMAEL      Follow-Up with Cardiology      Cardiac Rehab Referral      Medication Instructions - Anticoagulants    Do NOT stop your aspirin or platelet inhibitor unless directed by your Cardiologist.  These medications help to prevent platelets in your blood from sticking together and forming a clot.  Examples of these medications are:  Ticagrelor (Brilinta), Clopidigrel (Plavix), Prasugrel (Effient)     When to call - Contact the Heart Clinic    You may experience symptoms that require follow-up before your scheduled appointment. Contact the Heart Clinic if you develop: Fever over 100.4o Fahrenheit, that lasts more than one day; Redness, heat, or pus at the puncture site; Change in color or temperature in your hand or arm.     When to call - Reasons to Call 911    If your wrist puncture site starts bleeding after discharge, sit down and apply firm pressure with your thumb against the puncture site and fingers against the back of the wrist for 10 minutes. If the bleeding stops, continue to rest,  keeping your wrist still for 2 hours. Notify your doctor as soon as possible.  IF BLEEDING DOES NOT STOP OR THERE IS A LARGER AMOUNT OF BLEEDING OR SPURTING CALL 9-1-1 immediately.DO NOT drive yourself to the hospital.     Precautions - Lifting    DO NOT lift more than 5 pounds with affected arm for 48 hours     Precautions - Household Activities    Avoid excessive bending or movement of your wrist for 72 hours.  Do not subject hand/arm to any forceful movements for 24 hours, such as supporting weight when rising from a chair or bed.     Remove the band-aid on the puncture site after 24 hours and leave open to air. If minor oozing, you may apply a band-aid and remove after 12 hours.     Precautions - Active Sports Activities    DO NOT engage in vigorous exercise using your affected arm for 3 days after discharge.  This includes golf, tennis or swimming.     Precautions - Operating yard equipment or vehicles    Do not operate a chainsaw, lawnmower, motorcycle, or all-terrain vehicle for 48 hours after the procedure.     Precautions - Elective Dental Work    NO elective dental work for 6 weeks after receiving a stent.     Comfort and Pain Management - Bruising after Surgery    Expect mild tingling of hand and tenderness at the wrist puncture site for up to 3 days. You may take Tylenol or a pain medicine recommended by your doctor.     Activity - Cardiac Rehab    You are encouraged to enroll in an Outpatient Cardiac Rehab program after discharge from the hospital.  Our Cardiac Rehab staff may visit briefly with you while you're in the hospital.  If they miss you, someone will contact you after you are home.     Return to Driving    Driving is NOT permitted for 24 hours after surgery     Return to work    You may return to work after 72 hours if you are feeling well and your job does not involve heavy lifting.     Shower / Bathing    You may shower on the day after your procedure.  DO NOT soak of wrist with the puncture  site in water for 3 days to prevent infection. DO NOT take a tub bath or wash dishes for 3 days after the procedure     Dressing Removal    Remove the band-aid on the puncture site after 24 hours and leave open to air. If minor oozing, you may apply a band-aid and remove after 12 hours     Reason aspirin not prescribed from this order set    Discharge meds As per inpatient service     Reason Lipid Lowering Medications not prescribed from this order set    Discharge medications as per inpatient team     Medication Instructions - Anticoagulants    Do NOT stop your aspirin or platelet inhibitor unless directed by your Cardiologist.  These medications help to prevent platelets in your blood from sticking together and forming a clot.  Examples of these medications are:  Ticagrelor (Brilinta), Clopidigrel (Plavix), Prasugrel (Effient)     When to call - Contact the Heart Clinic    You may experience symptoms that require follow-up before your scheduled appointment. Contact the Heart Clinic if you develop: Fever over 100.4o Fahrenheit, that lasts more than one day; Redness, heat, or pus at the puncture site; Change in color or temperature in your hand or arm.     When to call - Reasons to Call 911    If your wrist puncture site starts bleeding after discharge, sit down and apply firm pressure with your thumb against the puncture site and fingers against the back of the wrist for 10 minutes. If the bleeding stops, continue to rest, keeping your wrist still for 2 hours. Notify your doctor as soon as possible.  IF BLEEDING DOES NOT STOP OR THERE IS A LARGER AMOUNT OF BLEEDING OR SPURTING CALL 9-1-1 immediately.DO NOT drive yourself to the hospital.     Precautions - Lifting    DO NOT lift more than 5 pounds with affected arm for 48 hours     Precautions - Household Activities    Avoid excessive bending or movement of your wrist for 72 hours.  Do not subject hand/arm to any forceful movements for 24 hours, such as supporting  weight when rising from a chair or bed.     Remove the band-aid on the puncture site after 24 hours and leave open to air. If minor oozing, you may apply a band-aid and remove after 12 hours.     Precautions - Active Sports Activities    DO NOT engage in vigorous exercise using your affected arm for 3 days after discharge.  This includes golf, tennis or swimming.     Precautions - Operating yard equipment or vehicles    Do not operate a chainsaw, lawnmower, motorcycle, or all-terrain vehicle for 48 hours after the procedure.     Precautions - Elective Dental Work    NO elective dental work for 6 weeks after receiving a stent.     Comfort and Pain Management - Bruising after Surgery    Expect mild tingling of hand and tenderness at the wrist puncture site for up to 3 days. You may take Tylenol or a pain medicine recommended by your doctor.     Activity - Cardiac Rehab    You are encouraged to enroll in an Outpatient Cardiac Rehab program after discharge from the hospital.  Our Cardiac Rehab staff may visit briefly with you while you're in the hospital.  If they miss you, someone will contact you after you are home.     Return to Driving    Driving is NOT permitted for 24 hours after surgery     Return to work    You may return to work after 72 hours if you are feeling well and your job does not involve heavy lifting.     Shower / Bathing    You may shower on the day after your procedure.  DO NOT soak of wrist with the puncture site in water for 3 days to prevent infection. DO NOT take a tub bath or wash dishes for 3 days after the procedure     Dressing Removal    Remove the band-aid on the puncture site after 24 hours and leave open to air. If minor oozing, you may apply a band-aid and remove after 12 hours     Reason Lipid Lowering Medications not prescribed from this order set    Defer til discharge     Reason aspirin not prescribed from this order set    Defer to inpatient and discharging team     Reason for your  hospital stay    Chest pain, heart attack     Follow-up and recommended labs and tests     Follow up with primary care provider, Blaire Hollins, within 7 days for hospital follow- up.  No follow up labs or test are needed.  Follow up with Outpatient cardiac rehab.  Cardiology follow up in 2-3 weeks w/ ISMAEL w/ repeat BMP, stress testing in 6-8 weeks for assessment of RCA territory, and follow up w/ Dr. Guevara in 3-4 months w/ repeat lipid panel/ALT     Activity    Your activity upon discharge: activity as tolerated     When to contact your care team    Call your primary doctor if you have any of the following: worsening chest pain, shortness of breath, fever, leg swelling     Diet    Follow this diet upon discharge: Orders Placed This Encounter      Low Saturated Fat Na <2400 mg     NM Exercise stress test (nuc card)     Discharge Medications   Current Discharge Medication List      START taking these medications    Details   aspirin (ASA) 81 MG EC tablet Take 1 tablet (81 mg) by mouth daily  Qty: 30 tablet, Refills: 0    Associated Diagnoses: NSTEMI (non-ST elevated myocardial infarction) (H)      nitroGLYcerin (NITROSTAT) 0.4 MG sublingual tablet For chest pain place 1 tablet under the tongue every 5 minutes for 3 doses. If symptoms persist 5 minutes after 1st dose call 911.  Qty: 20 tablet, Refills: 0    Associated Diagnoses: NSTEMI (non-ST elevated myocardial infarction) (H)      rosuvastatin (CRESTOR) 20 MG tablet Take 1 tablet (20 mg) by mouth daily  Qty: 30 tablet, Refills: 0    Associated Diagnoses: NSTEMI (non-ST elevated myocardial infarction) (H)      ticagrelor (BRILINTA) 90 MG tablet Take 1 tablet (90 mg) by mouth every 12 hours  Qty: 30 tablet, Refills: 0    Associated Diagnoses: NSTEMI (non-ST elevated myocardial infarction) (H)           Allergies   No Known Allergies  Data   Most Recent 3 CBC's:  Recent Labs   Lab Test 09/26/22  0606   WBC 4.8   HGB 17.3   MCV 91         Most Recent 3  BMP's:  Recent Labs   Lab Test 09/28/22  0620 09/27/22  0530 09/26/22  0606    139 139   POTASSIUM 4.1 4.2 3.9   CHLORIDE 107 106 104   CO2 26 27 28   BUN 17 16 22   CR 1.01 1.09 1.08   ANIONGAP 6 6 7   MICHELLE 9.1 8.6 9.2   * 132* 137*     Most Recent 2 LFT's:No lab results found.  Most Recent INR's and Anticoagulation Dosing History:  Anticoagulation Dose History    There is no flowsheet data to display.       Most Recent 3 Troponin's:No lab results found.  Most Recent Cholesterol Panel:  Recent Labs   Lab Test 09/26/22  0745   CHOL 143   LDL 94   HDL 28*   TRIG 105     Most Recent 6 Bacteria Isolates From Any Culture (See EPIC Reports for Culture Details):No lab results found.  Most Recent TSH, T4 and A1c Labs:  Recent Labs   Lab Test 09/27/22  0530 09/26/22  0606   TSH 0.89  --    A1C  --  5.5       Results for orders placed or performed during the hospital encounter of 09/26/22   Chest XR,  PA & LAT    Narrative    EXAM: XR CHEST 2 VIEWS  LOCATION: [facility]  DATE/TIME: 9/26/2022 6:53 AM    INDICATION: chest pain  COMPARISON: None.      Impression    IMPRESSION: Heart size is normal. Lungs are clear. No pneumothorax or pleural effusion.   CT Chest Pulmonary Embolism w Contrast    Narrative    CT CHEST PULMONARY EMBOLISM W CONTRAST 9/26/2022 7:25 AM    CLINICAL HISTORY: chest pain, shortness of breath  TECHNIQUE: CT angiogram chest during arterial phase injection IV  contrast. 2D and 3D MIP reconstructions were performed by the CT  technologist. Dose reduction techniques were used.     CONTRAST: 62mL Isovue-370    COMPARISON: None.    FINDINGS:  ANGIOGRAM CHEST: Pulmonary arteries are normal caliber and negative  for pulmonary emboli. Thoracic aorta is negative for dissection. No CT  evidence of right heart strain.    LUNGS AND PLEURA: Minimal biapical fibrosis. Lungs otherwise clear. No  pleural effusion or pneumothorax.    MEDIASTINUM/AXILLAE: No lymphadenopathy. Moderate  coronary artery  calcification.    UPPER ABDOMEN: Normal.    MUSCULOSKELETAL: Normal.      Impression    IMPRESSION:  1.  No pulmonary embolus.  2.  No acute findings in the chest.  3.  Moderate coronary artery calcification.    JAMEEL BARRERA MD         SYSTEM ID:  [id number]   Echocardiogram Complete     Value    LVEF  60-65%    Narrative    [id number]  [id number]  [id number]  [id number]^INO^RYAN^TONE     [facility]  Echocardiography Laboratory  [address] [place] [address]     Name: TIM GARCIA  MRN: [id number]  : 1965  Study Date: 2022 09:56 AM  Age: 57 yrs  Gender: Male  Patient Location: [facility]  Reason For Study: Chest Pain  Ordering Physician: RYAN MCKINNON  Referring Physician: Blaire Hollins  Performed By: Noe Valentin     BSA: 1.9 m2  Height: 71 in  Weight: 161 lb  HR: 78  BP: 104/76 mmHg  ______________________________________________________________________________  Procedure  Complete Portable Echo Adult. Optison (NDC #[id number]) given intravenously.  ______________________________________________________________________________  Interpretation Summary     The visual ejection fraction is 60-65%.  There is hypokinesis of the mid-basal lateral and inferolateral walls of the  LV.  The right ventricle is normal in size and function.  No hemodynamically significant valvular disease.  ______________________________________________________________________________  Left Ventricle  The left ventricle is normal in structure, function and size. The visual  ejection fraction is 60-65%. Diastolic Doppler findings (E/E' ratio and/or  other parameters) suggest left ventricular filling pressures are normal. There  is hypokinesis of the mid-basal lateral and inferolateral walls of the LV.     Right Ventricle  The right ventricle is normal in size and function.     Atria  Normal left atrial size. Right atrial size is normal.     Mitral Valve  The mitral valve is normal in  structure and function. There is trace mitral  regurgitation.     Tricuspid Valve  The tricuspid valve is not well visualized, but is grossly normal. Right  ventricular systolic pressure could not be approximated due to inadequate  tricuspid regurgitation.     Aortic Valve  The aortic valve is normal in structure and function. No hemodynamically  significant valvular aortic stenosis.     Pulmonic Valve  The pulmonic valve is not well visualized. There is trace to mild pulmonic  valvular regurgitation.     Vessels  The aortic root is normal size. The inferior vena cava was normal in size with  preserved respiratory variability.     Pericardium  Trivial anterior pericardial effusion.     ______________________________________________________________________________  MMode/2D Measurements & Calculations  RVDd: 3.4 cm  IVSd: 0.64 cm  LVIDd: 4.2 cm  LVIDs: 2.8 cm  LVPWd: 0.85 cm  FS: 34.0 %  LV mass(C)d: 91.7 grams  LV mass(C)dI: 47.7 grams/m2     Ao root diam: 3.2 cm  LA dimension: 3.1 cm  asc Aorta Diam: 3.1 cm  LA/Ao: 0.97  LVOT diam: 2.4 cm  LVOT area: 4.4 cm2  LA Volume (BP): 54.4 ml  LA Volume Index (BP): 28.3 ml/m2  RWT: 0.40     Doppler Measurements & Calculations  MV E max ana: 65.5 cm/sec  MV A max ana: 75.0 cm/sec  MV E/A: 0.87  MV dec slope: 268.2 cm/sec2  MV dec time: 0.25 sec     LV V1 max PG: 3.3 mmHg  LV V1 max: 90.4 cm/sec  LV V1 VTI: 20.6 cm  SV(LVOT): 90.0 ml  SI(LVOT): 46.8 ml/m2  PA acc time: 0.13 sec  PI end-d ana: 54.7 cm/sec  E/E' av.9  Lateral E/e': 5.3  Medial E/e': 8.5     ______________________________________________________________________________  Report approved by: Kimi Royal 2022 11:40 AM         Cardiac Catheterization    Narrative      Prox LAD lesion is 70% stenosed.    Mid LAD lesion is 30% stenosed.    1st Mrg lesion is 99% stenosed.    Prox Cx lesion is 30% stenosed.    Prox RCA lesion is 40% stenosed.    Mid RCA lesion is 70% stenosed.     1. Multivessel CAD with  99% culprit lesion in 1st Obtuse Marginal with   LAKESHIA 2 flow.  2. Status post successful PCI of OM1 with ILANA x1 (Synergy 3.00 x 28 mm   drug-eluting stent) with restoration of LAKESHIA 3 flow.  3. Residual 70% prox LAD and 70% mRCA lesions.     Cardiac Catheterization    Narrative      Prox LAD lesion is 80% stenosed.    Mid LAD lesion is 25% stenosed.    1st Diag lesion is 50% stenosed.     1.  Uneventful staged PCI of the proximal left anterior descending artery   with a 3.5 x 16 mm Synergy drug-eluting stent.  2.  Patent stent from the proximal circumflex into the large first obtuse   marginal.  3.  Right coronary artery was not injected on today's exam.

## 2022-09-29 ENCOUNTER — PATIENT OUTREACH (OUTPATIENT)
Dept: FAMILY MEDICINE | Facility: CLINIC | Age: 57
End: 2022-09-29

## 2022-09-29 NOTE — TELEPHONE ENCOUNTER
What type of discharge? Inpatient  Risk of Hospital admission or ED visit: 10%  Is a TCM episode required? No  When should the patient follow up with PCP? within 30 days of discharge.    Karena Carvalho RN  [facility]

## 2022-09-30 ENCOUNTER — TELEPHONE (OUTPATIENT)
Dept: CARDIOLOGY | Facility: CLINIC | Age: 57
End: 2022-09-30

## 2022-09-30 DIAGNOSIS — I21.4 NSTEMI (NON-ST ELEVATED MYOCARDIAL INFARCTION) (H): ICD-10-CM

## 2022-09-30 NOTE — TELEPHONE ENCOUNTER
"Hospital/TCU/ED for chronic condition Discharge Protocol    \"Hi, my name is Chinmay Garza RN, a registered nurse, and I am calling from [facility].  I am calling to follow up and see how things are going for you after your recent emergency visit/hospital/TCU stay.\"    Tell me how you are doing now that you are home?\"     \"I am doing good and feeling good, getting back into activities slowly which has been really good\"      Discharge Instructions    \"Let's review your discharge instructions.  What is/are the follow-up recommendations?  Pt. Response:     \"I need to connect with cardiology and then establish care with a new primary doctor\"    \"Has an appointment with your primary care provider been scheduled?\"   Yes. (confirm)     Patient has upcoming appointment for hosp follow up and to establish care with Dr. Schulz:    10/5/2022 2:30 PM (Arrive by 2:15 PM) Gelacio Schulz MD [facility]       \"When you see the provider, I would recommend that you bring your medications with you.\"    Medications    \"Tell me what changed about your medicines when you discharged?\"    Changes to chronic meds?    2 or more - Epic MTM referral needed    Patient was not taking any medications prior to hospitalization. - all meds in med list are new     \"What questions do you have about your medications?\"    None     New diagnoses of heart failure, COPD, diabetes, or MI?    No    Post Discharge Medication Reconciliation Status: discharge medications reconciled and changed, per note/orders.    Was MTM referral placed (*Make sure to put transitions as reason for referral)?   No (patient declines appointment with MTM at this time, states he does not have any questions about his medications. Will call back with further questions or concerns.  Call Summary    \"What questions or concerns do you have about your recent visit and your follow-up care?\"     none    \"If you have questions or things don't continue " "to improve, we encourage you contact us through the main clinic number (give number).  Even if the clinic is not open, triage nurses are available 24/7 to help you.     We would like you to know that our clinic has extended hours (provide information).  We also have urgent care (provide details on closest location and hours/contact info)\"      \"Thank you for your time and take care!\"    Chinmay Garza RN  [facility]             "

## 2022-09-30 NOTE — TELEPHONE ENCOUNTER
Patient was admitted to [facility] on 9/26/22 with chest pain-NSTEMI.    PMH: skin melanoma, family history of premature CAD.    Echo showed EF of 60% with no valve abnormalities; there is mid-basal lateral to inferolateral hypokinesis.    9/26/22: Coronary angiogram via RRA showed multivessel CAD with 99% culprit lesion in 1st Obtuse Marginal with LAKESHIA 2 flow. Status post successful PCI of OM1 with ILANA x 1 (Synergy 3.00 x 28 mm drug-eluting stent) with restoration of LAKESHIA 3 flow. Residual 70% prox LAD and 70% mRCA lesions.    9/27/22: Repeat coronary angiogram via RRA resulted in uneventful staged PCI of the proximal left anterior descending artery with a 3.5 x 16 mm Synergy drug-eluting stent. Patent stent from the proximal circumflex into the large first obtuse marginal. Residual moderate RCA lesion with medical therapy at this time.    Pt was started on ASA, NTG, Crestor and Brilinta at time of discharge. Stress testing in 6-8 weeks for assessment of RCA territory.    Called patient to discuss any post hospital d/c questions, review medication changes, and confirm f/u appts, but no answer. VM left to return my phone call.      RN will confirm with patient that he was d/c with an adequate supply of the antiplatelet Brilinta, and will be reminded of importance of taking without interruption.     Pt has an Rx for PRN SL Nitroglycerin.     RN will confirm with patient that he is scheduled for labs on 10/20/22 at 0800 followed with an OV at 0900 with ISMAEL Floresita Yao at our [facility]. Scheduled for OP NM stress test on 11/16/22 at 0815 in [place].     Cardiac rehab still needs to be scheduled. RED Nassar RN.

## 2022-10-01 LAB
ATRIAL RATE - MUSE: 64 BPM
ATRIAL RATE - MUSE: 73 BPM
DIASTOLIC BLOOD PRESSURE - MUSE: NORMAL MMHG
DIASTOLIC BLOOD PRESSURE - MUSE: NORMAL MMHG
INTERPRETATION ECG - MUSE: NORMAL
INTERPRETATION ECG - MUSE: NORMAL
P AXIS - MUSE: 54 DEGREES
P AXIS - MUSE: 69 DEGREES
PR INTERVAL - MUSE: 130 MS
PR INTERVAL - MUSE: 134 MS
QRS DURATION - MUSE: 84 MS
QRS DURATION - MUSE: 88 MS
QT - MUSE: 388 MS
QT - MUSE: 404 MS
QTC - MUSE: 416 MS
QTC - MUSE: 427 MS
R AXIS - MUSE: 56 DEGREES
R AXIS - MUSE: 80 DEGREES
SYSTOLIC BLOOD PRESSURE - MUSE: NORMAL MMHG
SYSTOLIC BLOOD PRESSURE - MUSE: NORMAL MMHG
T AXIS - MUSE: -17 DEGREES
T AXIS - MUSE: 6 DEGREES
VENTRICULAR RATE- MUSE: 64 BPM
VENTRICULAR RATE- MUSE: 73 BPM

## 2022-10-03 NOTE — TELEPHONE ENCOUNTER
Writer attempted to return pt's phone message, but now, no answer. VM left to return my phone call. RED Nassar RN.

## 2022-10-05 ENCOUNTER — OFFICE VISIT (OUTPATIENT)
Dept: FAMILY MEDICINE | Facility: CLINIC | Age: 57
End: 2022-10-05

## 2022-10-05 VITALS
BODY MASS INDEX: 22.54 KG/M2 | HEIGHT: 71 IN | WEIGHT: 161 LBS | OXYGEN SATURATION: 100 % | RESPIRATION RATE: 16 BRPM | TEMPERATURE: 97.6 F | SYSTOLIC BLOOD PRESSURE: 123 MMHG | DIASTOLIC BLOOD PRESSURE: 77 MMHG | HEART RATE: 72 BPM

## 2022-10-05 DIAGNOSIS — Z95.5 S/P PRIMARY ANGIOPLASTY WITH CORONARY STENT: ICD-10-CM

## 2022-10-05 DIAGNOSIS — I21.4 NSTEMI (NON-ST ELEVATED MYOCARDIAL INFARCTION) (H): Primary | ICD-10-CM

## 2022-10-05 PROCEDURE — 99495 TRANSJ CARE MGMT MOD F2F 14D: CPT | Performed by: FAMILY MEDICINE

## 2022-10-05 ASSESSMENT — PAIN SCALES - GENERAL: PAINLEVEL: NO PAIN (0)

## 2022-10-05 NOTE — PROGRESS NOTES
"  Assessment & Plan     NSTEMI (non-ST elevated myocardial infarction) (H)  Stable, has normal BP  Scheduled seeing cardiology and cardiac  Rehab,   His BP and HR are stable, did not start BB, encouraged him to see cardiology as scheduled     S/P primary angioplasty with coronary stent  Mentioned above            FUTURE APPOINTMENTS:       - Follow-up visit in 4 months for CPE    No follow-ups on file.    Gelacio Schulz MD  [facility] JAD Patel is a 57 year old presenting for the following health issues:  No chief complaint on file.      [facility]       Hospital Follow-up Visit:    Hospital/Nursing Home/IP Rehab Facility: [facility]  Date of Admission: 09/26  Date of Discharge: 09/28  Reason(s) for Admission: NSTEMI     Was your hospitalization related to COVID-19? No   Problems taking medications regularly:  None  Medication changes since discharge: None  Problems adhering to non-medication therapy:  None    Summary of hospitalization:  [facility] discharge summary reviewed  Diagnostic Tests/Treatments reviewed.  Follow up needed: none  Other Healthcare Providers Involved in Patient s Care:         None  Update since discharge: improved.         Post Medication Reconciliation Status:        Plan of care communicated with patient               Review of Systems   Constitutional, HEENT, cardiovascular, pulmonary, gi and gu systems are negative, except as otherwise noted.      Objective    /77   Pulse 72   Temp 97.6  F (36.4  C) (Temporal)   Resp 16   Ht 1.803 m (5' 11\")   Wt 73 kg (161 lb)   SpO2 100%   BMI 22.45 kg/m    Body mass index is 22.45 kg/m .  Physical Exam   GENERAL: healthy, alert and no distress  NECK: no adenopathy, no asymmetry, masses, or scars and thyroid normal to palpation  RESP: lungs clear to auscultation - no rales, rhonchi or wheezes  CV: regular rate and rhythm, normal S1 S2, no S3 or S4, no murmur, click or " rub, no peripheral edema and peripheral pulses strong  ABDOMEN: soft, nontender, no hepatosplenomegaly, no masses and bowel sounds normal  MS: no gross musculoskeletal defects noted, no edema

## 2022-10-06 ENCOUNTER — TRANSFERRED RECORDS (OUTPATIENT)
Dept: CARDIAC REHAB | Facility: CLINIC | Age: 57
End: 2022-10-06

## 2022-10-06 ENCOUNTER — HOSPITAL ENCOUNTER (OUTPATIENT)
Dept: CARDIAC REHAB | Facility: CLINIC | Age: 57
Discharge: HOME | End: 2022-10-06
Attending: INTERNAL MEDICINE

## 2022-10-06 DIAGNOSIS — I21.9 ACUTE MYOCARDIAL INFARCTION, INITIAL EPISODE OF CARE (H): ICD-10-CM

## 2022-10-06 PROCEDURE — 93798 PHYS/QHP OP CAR RHAB W/ECG: CPT | Performed by: OCCUPATIONAL THERAPIST

## 2022-10-06 PROCEDURE — 93797 PHYS/QHP OP CAR RHAB WO ECG: CPT | Performed by: OCCUPATIONAL THERAPIST

## 2022-10-06 NOTE — TELEPHONE ENCOUNTER
Writer called pt and he denies any chest pain, SOB or lightheadedness.    RRA access site has healed without signs of infection, swelling or bleeding.    Denies any medication questions. States he was only provided at 15 day supply of Brilinta at time of discharge. Writer will escribed another 15 day supply to pt's [facility] per his request. Reminded of importance to continue taking every 12 hrs without interruption.     Reviewed f/u appt's as below.    Pt verbalized understanding of all instructions without further questions. RED Nassar RN.

## 2022-10-17 ENCOUNTER — TELEPHONE (OUTPATIENT)
Dept: CARDIOLOGY | Facility: CLINIC | Age: 57
End: 2022-10-17

## 2022-10-17 DIAGNOSIS — I21.4 NSTEMI (NON-ST ELEVATED MYOCARDIAL INFARCTION) (H): Primary | ICD-10-CM

## 2022-10-17 NOTE — TELEPHONE ENCOUNTER
Message from Dr. Guevara:  Francilo     This patient needs an exercise echocardiogram in 6 to 8 weeks, not a nuclear stress test.     Please cancel his scheduled nuclear stress test, kindly order an exercise echocardiogram prior to his return visit with me.     Thank you.   Dr. Guevara       Order placed for exercise stress test to replace the scheduled nuclear study.

## 2022-10-20 ENCOUNTER — LAB (OUTPATIENT)
Dept: LAB | Facility: CLINIC | Age: 57
End: 2022-10-20

## 2022-10-20 ENCOUNTER — OFFICE VISIT (OUTPATIENT)
Dept: CARDIOLOGY | Facility: CLINIC | Age: 57
End: 2022-10-20

## 2022-10-20 VITALS
DIASTOLIC BLOOD PRESSURE: 79 MMHG | BODY MASS INDEX: 22.48 KG/M2 | OXYGEN SATURATION: 99 % | HEART RATE: 65 BPM | WEIGHT: 160.6 LBS | HEIGHT: 71 IN | SYSTOLIC BLOOD PRESSURE: 131 MMHG

## 2022-10-20 DIAGNOSIS — I21.4 NSTEMI (NON-ST ELEVATED MYOCARDIAL INFARCTION) (H): ICD-10-CM

## 2022-10-20 LAB
ANION GAP SERPL CALCULATED.3IONS-SCNC: 6 MMOL/L (ref 3–14)
BUN SERPL-MCNC: 15 MG/DL (ref 7–30)
CALCIUM SERPL-MCNC: 9.6 MG/DL (ref 8.5–10.1)
CHLORIDE BLD-SCNC: 105 MMOL/L (ref 94–109)
CO2 SERPL-SCNC: 27 MMOL/L (ref 20–32)
CREAT SERPL-MCNC: 1.1 MG/DL (ref 0.66–1.25)
GFR SERPL CREATININE-BSD FRML MDRD: 79 ML/MIN/1.73M2
GLUCOSE BLD-MCNC: 106 MG/DL (ref 70–99)
POTASSIUM BLD-SCNC: 4.1 MMOL/L (ref 3.4–5.3)
SODIUM SERPL-SCNC: 138 MMOL/L (ref 133–144)

## 2022-10-20 PROCEDURE — 36415 COLL VENOUS BLD VENIPUNCTURE: CPT | Performed by: PHYSICIAN ASSISTANT

## 2022-10-20 PROCEDURE — 99214 OFFICE O/P EST MOD 30 MIN: CPT | Performed by: NURSE PRACTITIONER

## 2022-10-20 PROCEDURE — 80048 BASIC METABOLIC PNL TOTAL CA: CPT | Performed by: PHYSICIAN ASSISTANT

## 2022-10-20 RX ORDER — ROSUVASTATIN CALCIUM 20 MG/1
20 TABLET, COATED ORAL DAILY
Qty: 30 TABLET | Refills: 3 | Status: SHIPPED | OUTPATIENT
Start: 2022-10-20

## 2022-10-20 NOTE — PROGRESS NOTES
Cardiology Clinic Progress Note  Jorge Thomas MRN# [id number]   YOB: 1965 Age: 57 year old   Primary Cardiologist: Dr. Guevara Reason for visit: Follow up hospitalization            Assessment and Plan:       1.  Multivessel coronary artery disease, s/p ILANA x1 to OM and LIANA x1 to pLAD (9/2022)  -Continue dual antiplatelet therapy with aspirin and Brilinta, will message pharmacy liaison regarding cost to switch from Brilinta to Plavix, this can be addressed at his follow-up visit in December  -Patient to complete a follow-up exercise stress echocardiogram  In 3-6 weeks to evaluate for ischemia in the right coronary artery territory  -Patient inquired regarding level of physical activity.  I advised him not to perform any strenuous exercise until after he can have his follow-up stress echocardiogram completed as he has known residual RCA disease.  Also advised him to keep his nitro with him at all times.  -Advised him to attend cardiac rehab, at least for a few sessions once his insurance issues are solved    2.  Hyperlipidemia, goal LDL <70  -Recheck lipid panel December 2022 at follow-up visit with Dr. Guevara  -Discussed incorporating a plant-based and Mediterranean diet to help reduce LDL    3.  Bradycardia  -Patient was not started on a beta-blocker due to bradycardia during hospitalization, heart rate remains in the 60s today  -Will not start beta-blockers to avoid bradycardia considering patient has preserved ejection fraction.      Changes today: No medication changes were made today    Follow up plan: Patient to have follow-up stress echocardiogram in approximately 3-6 weeks, fasting lipid panel and follow-up with Dr. Guevara as ordered for December 2022        History of Presenting Illness:    Jorge Thomas is a very pleasant 57 year old male with a history of NSTEMI, and family history of coronary disease, his father had a multivessel bypass in his 60s, melanoma.     Patient was hospitalized September 26  through September 28 with a NSTEMI after presenting to the emergency room with chest discomfort.  He underwent coronary angiogram on 9/26 which showed multivessel coronary artery disease with a 99% culprit lesion in the OM1 and subsequent PCI with 1 ILANA.  He has residual 70% proximal LAD as well as 70% mid RCA lesions.  A staged PCI was completed to the proximal LAD with 1 ILANA on 9/28.  Plan was to continue medical management of the residual RCA lesion.  He was discharged on aspirin, Brilinta, and high intensity statin therapy.  He was not started on a beta-blocker due to resting bradycardia and nor an ACE inhibitor due to hypotension.    Patient is here today for follow-up from his hospitalization. Patient reports feeling good. He has been feeling more anxious since he was hospitalized.     Patient denies chest pain or chest tightness. Denies dizziness, lightheadedness or other presyncopal symptoms. Denies tachycardia or palpitations. Denies shortness of breath, orthopnea, or PND. No edema.     Labs today show normal sodium at 138, potassium 4.1, BUN 15, creatinine 1.1, GFR 79.  Blood pressure 131/79 and HR 65 in clinic today.    No excess bleeding or bruising with DAPT.     Patient is on a medi-share plan and is not sure if his cardiac rehab will be covered, so has not started. He has been walking and light jogging at home without any symptoms.         Recent Hospitalizations   As above          Social History    Patient works as a   Social History     Socioeconomic History     Marital status:      Spouse name: Not on file     Number of children: Not on file     Years of education: Not on file     Highest education level: Not on file   Occupational History     Occupation:    Tobacco Use     Smoking status: Never     Smokeless tobacco: Never   Substance and Sexual Activity     Alcohol use: Yes     Comment: occ wine     Drug use: Never     Sexual activity: Not on file   Other  "Topics Concern     Not on file   Social History Narrative    , 2 kids, works as a      Social Determinants of Health     Financial Resource Strain: Not on file   Food Insecurity: Not on file   Transportation Needs: Not on file   Physical Activity: Not on file   Stress: Not on file   Social Connections: Not on file   Intimate Partner Violence: Not on file   Housing Stability: Not on file            Review of Systems:   Skin:  not assessed     Eyes:  not assessed    ENT:  not assessed    Respiratory:  Negative shortness of breath  Cardiovascular:  Negative;palpitations;chest pain;lightheadedness;fatigue;dizziness    Gastroenterology: not assessed    Genitourinary:  not assessed    Musculoskeletal:  not assessed    Neurologic:  not assessed    Psychiatric:  not assessed    Heme/Lymph/Imm:  not assessed    Endocrine:  not assessed           Physical Exam:   Vitals: /79   Pulse 65   Ht 1.803 m (5' 11\")   Wt 72.8 kg (160 lb 9.6 oz)   SpO2 99%   BMI 22.40 kg/m     Wt Readings from Last 4 Encounters:   10/20/22 72.8 kg (160 lb 9.6 oz)   10/05/22 73 kg (161 lb)   09/28/22 73.3 kg (161 lb 8 oz)   03/08/12 68 kg (150 lb)     GEN: well nourished, in no acute distress.  HEENT:  Pupils equal, round. Sclerae nonicteric.   NECK: Supple, no masses appreciated. No JVD with patient. No carotid bruit  C/V:  Regular rate and rhythm, no murmur, rub or gallop.    RESP: Respirations are unlabored. Clear to auscultation bilaterally without wheezing, rales, or rhonchi.  GI: Abdomen soft, nontender.  EXTREM: no LE edema.  NEURO: Alert and oriented, cooperative.  SKIN: Warm and dry. Right wrist puncture site well healed, without bleeding or bruising       Data:     LIPID RESULTS:  Lab Results   Component Value Date    CHOL 143 09/26/2022    HDL 28 (L) 09/26/2022    LDL 94 09/26/2022    TRIG 105 09/26/2022     LIVER ENZYME RESULTS:  No results found for: AST, ALT  CBC RESULTS:  Lab Results   Component Value " Date    WBC 4.8 09/26/2022    RBC 5.37 09/26/2022    HGB 17.3 09/26/2022    HCT 49.1 09/26/2022    MCV 91 09/26/2022    MCH 32.2 09/26/2022    MCHC 35.2 09/26/2022    RDW 11.6 09/26/2022     09/26/2022     BMP RESULTS:  Lab Results   Component Value Date     10/20/2022    POTASSIUM 4.1 10/20/2022    CHLORIDE 105 10/20/2022    CO2 27 10/20/2022    ANIONGAP 6 10/20/2022     (H) 10/20/2022    BUN 15 10/20/2022    CR 1.10 10/20/2022    GFRESTIMATED 79 10/20/2022    MICHELLE 9.6 10/20/2022      A1C RESULTS:  Lab Results   Component Value Date    A1C 5.5 09/26/2022     INR RESULTS:  No results found for: INR         Medications     Current Outpatient Medications   Medication Sig Dispense Refill     aspirin (ASA) 81 MG EC tablet Take 1 tablet (81 mg) by mouth daily 30 tablet 0     nitroGLYcerin (NITROSTAT) 0.4 MG sublingual tablet For chest pain place 1 tablet under the tongue every 5 minutes for 3 doses. If symptoms persist 5 minutes after 1st dose call 911. 20 tablet 0     rosuvastatin (CRESTOR) 20 MG tablet Take 1 tablet (20 mg) by mouth daily 30 tablet 3     ticagrelor (BRILINTA) 90 MG tablet Take 1 tablet (90 mg) by mouth 2 times daily 30 tablet 3          Past Medical History     Past Medical History:   Diagnosis Date     Allergic rhinitis      Past Surgical History:   Procedure Laterality Date     APPENDECTOMY       CV CORONARY ANGIOGRAM N/A 9/27/2022    Procedure: Coronary Angiogram;  Surgeon: Emily Barba MD;  Location: [facility] CARDIAC CATH LAB     CV CORONARY ANGIOGRAM N/A 9/26/2022    Procedure: Coronary Angiogram;  Surgeon: Yury Juan MD;  Location: [facility] CARDIAC CATH LAB     CV PCI N/A 9/27/2022    Procedure: Percutaneous Coronary Intervention;  Surgeon: Emily Barba MD;  Location: [facility] CARDIAC CATH LAB     CV PCI N/A 9/26/2022    Procedure: Percutaneous Coronary Intervention;  Surgeon: Yury Juan MD;  [facility] CARDIAC [facility] LAB     Family History    Problem Relation Age of Onset     CABG Father 60     Cardiomyopathy No family hx of      Valvular heart disease No family hx of             Allergies   Patient has no known allergies.        Michelle Yao NP  [facility]  Pager: [phone]

## 2022-10-20 NOTE — PATIENT INSTRUCTIONS
Today's Recommendations    Avoid strenuous exercise until after your stress test  We will recheck your cholesterol in December  Continue all medications without changes.  Please follow up with Dr. Guevara on 12/22/22.    Please send a PartyLine message or call [phone] to the RN team with questions or concerns.     Scheduling number [phone]  ESTUARDO Reagan, CNP

## 2022-10-20 NOTE — LETTER
10/20/2022    Gelacio Schulz MD  [address] [address]    RE: Jorge Sotos       Dear Colleague,     I had the pleasure of seeing Jorge Thomas in the [facility].  Cardiology Clinic Progress Note  Jorge Thomas MRN# [id number]   YOB: 1965 Age: 57 year old   Primary Cardiologist: Dr. Guevara Reason for visit: Follow up hospitalization            Assessment and Plan:       1.  Multivessel coronary artery disease, s/p ILANA x1 to OM and ILANA x1 to pLAD (9/2022)  -Continue dual antiplatelet therapy with aspirin and Brilinta, will message pharmacy liaison regarding cost to switch from Brilinta to Plavix, this can be addressed at his follow-up visit in December  -Patient to complete a follow-up exercise stress echocardiogram  In 3-6 weeks to evaluate for ischemia in the right coronary artery territory  -Patient inquired regarding level of physical activity.  I advised him not to perform any strenuous exercise until after he can have his follow-up stress echocardiogram completed as he has known residual RCA disease.  Also advised him to keep his nitro with him at all times.  -Advised him to attend cardiac rehab, at least for a few sessions once his insurance issues are solved    2.  Hyperlipidemia, goal LDL <70  -Recheck lipid panel December 2022 at follow-up visit with Dr. Guevara  -Discussed incorporating a plant-based and Mediterranean diet to help reduce LDL    3.  Bradycardia  -Patient was not started on a beta-blocker due to bradycardia during hospitalization, heart rate remains in the 60s today  -Will not start beta-blockers to avoid bradycardia considering patient has preserved ejection fraction.      Changes today: No medication changes were made today    Follow up plan: Patient to have follow-up stress echocardiogram in approximately 3-6 weeks, fasting lipid panel and follow-up with Dr. Guevara as ordered for December 2022        History of Presenting Illness:    Jorge MARTINEZ  William is a very pleasant 57 year old male with a history of NSTEMI, and family history of coronary disease, his father had a multivessel bypass in his 60s, melanoma.     Patient was hospitalized September 26 through September 28 with a NSTEMI after presenting to the emergency room with chest discomfort.  He underwent coronary angiogram on 9/26 which showed multivessel coronary artery disease with a 99% culprit lesion in the OM1 and subsequent PCI with 1 ILANA.  He has residual 70% proximal LAD as well as 70% mid RCA lesions.  A staged PCI was completed to the proximal LAD with 1 ILANA on 9/28.  Plan was to continue medical management of the residual RCA lesion.  He was discharged on aspirin, Brilinta, and high intensity statin therapy.  He was not started on a beta-blocker due to resting bradycardia and nor an ACE inhibitor due to hypotension.    Patient is here today for follow-up from his hospitalization. Patient reports feeling good. He has been feeling more anxious since he was hospitalized.     Patient denies chest pain or chest tightness. Denies dizziness, lightheadedness or other presyncopal symptoms. Denies tachycardia or palpitations. Denies shortness of breath, orthopnea, or PND. No edema.     Labs today show normal sodium at 138, potassium 4.1, BUN 15, creatinine 1.1, GFR 79.  Blood pressure 131/79 and HR 65 in clinic today.    No excess bleeding or bruising with DAPT.     Patient is on a medi-share plan and is not sure if his cardiac rehab will be covered, so has not started. He has been walking and light jogging at home without any symptoms.         Recent Hospitalizations   As above          Social History    Patient works as a   Social History     Socioeconomic History     Marital status:      Spouse name: Not on file     Number of children: Not on file     Years of education: Not on file     Highest education level: Not on file   Occupational History     Occupation: Financial  "Planner   Tobacco Use     Smoking status: Never     Smokeless tobacco: Never   Substance and Sexual Activity     Alcohol use: Yes     Comment: occ wine     Drug use: Never     Sexual activity: Not on file   Other Topics Concern     Not on file   Social History Narrative    , 2 kids, works as a      Social Determinants of Health     Financial Resource Strain: Not on file   Food Insecurity: Not on file   Transportation Needs: Not on file   Physical Activity: Not on file   Stress: Not on file   Social Connections: Not on file   Intimate Partner Violence: Not on file   Housing Stability: Not on file            Review of Systems:   Skin:  not assessed     Eyes:  not assessed    ENT:  not assessed    Respiratory:  Negative shortness of breath  Cardiovascular:  Negative;palpitations;chest pain;lightheadedness;fatigue;dizziness    Gastroenterology: not assessed    Genitourinary:  not assessed    Musculoskeletal:  not assessed    Neurologic:  not assessed    Psychiatric:  not assessed    Heme/Lymph/Imm:  not assessed    Endocrine:  not assessed           Physical Exam:   Vitals: /79   Pulse 65   Ht 1.803 m (5' 11\")   Wt 72.8 kg (160 lb 9.6 oz)   SpO2 99%   BMI 22.40 kg/m     Wt Readings from Last 4 Encounters:   10/20/22 72.8 kg (160 lb 9.6 oz)   10/05/22 73 kg (161 lb)   09/28/22 73.3 kg (161 lb 8 oz)   03/08/12 68 kg (150 lb)     GEN: well nourished, in no acute distress.  HEENT:  Pupils equal, round. Sclerae nonicteric.   NECK: Supple, no masses appreciated. No JVD with patient. No carotid bruit  C/V:  Regular rate and rhythm, no murmur, rub or gallop.    RESP: Respirations are unlabored. Clear to auscultation bilaterally without wheezing, rales, or rhonchi.  GI: Abdomen soft, nontender.  EXTREM: no LE edema.  NEURO: Alert and oriented, cooperative.  SKIN: Warm and dry. Right wrist puncture site well healed, without bleeding or bruising       Data:     LIPID RESULTS:  Lab Results "   Component Value Date    CHOL 143 09/26/2022    HDL 28 (L) 09/26/2022    LDL 94 09/26/2022    TRIG 105 09/26/2022     LIVER ENZYME RESULTS:  No results found for: AST, ALT  CBC RESULTS:  Lab Results   Component Value Date    WBC 4.8 09/26/2022    RBC 5.37 09/26/2022    HGB 17.3 09/26/2022    HCT 49.1 09/26/2022    MCV 91 09/26/2022    MCH 32.2 09/26/2022    MCHC 35.2 09/26/2022    RDW 11.6 09/26/2022     09/26/2022     BMP RESULTS:  Lab Results   Component Value Date     10/20/2022    POTASSIUM 4.1 10/20/2022    CHLORIDE 105 10/20/2022    CO2 27 10/20/2022    ANIONGAP 6 10/20/2022     (H) 10/20/2022    BUN 15 10/20/2022    CR 1.10 10/20/2022    GFRESTIMATED 79 10/20/2022    MICHELLE 9.6 10/20/2022      A1C RESULTS:  Lab Results   Component Value Date    A1C 5.5 09/26/2022     INR RESULTS:  No results found for: INR         Medications     Current Outpatient Medications   Medication Sig Dispense Refill     aspirin (ASA) 81 MG EC tablet Take 1 tablet (81 mg) by mouth daily 30 tablet 0     nitroGLYcerin (NITROSTAT) 0.4 MG sublingual tablet For chest pain place 1 tablet under the tongue every 5 minutes for 3 doses. If symptoms persist 5 minutes after 1st dose call 911. 20 tablet 0     rosuvastatin (CRESTOR) 20 MG tablet Take 1 tablet (20 mg) by mouth daily 30 tablet 3     ticagrelor (BRILINTA) 90 MG tablet Take 1 tablet (90 mg) by mouth 2 times daily 30 tablet 3          Past Medical History     Past Medical History:   Diagnosis Date     Allergic rhinitis      Past Surgical History:   Procedure Laterality Date     APPENDECTOMY       CV CORONARY ANGIOGRAM N/A 9/27/2022    Procedure: Coronary Angiogram;  Surgeon: Emily Barba MD;  Location:  HEART CARDIAC CATH LAB     CV CORONARY ANGIOGRAM N/A 9/26/2022    Procedure: Coronary Angiogram;  Surgeon: Yury Juan MD;  Location:  HEART CARDIAC CATH LAB     CV PCI N/A 9/27/2022    Procedure: Percutaneous Coronary Intervention;  Surgeon: Jameson  MD Emily;  Location:  HEART CARDIAC CATH LAB     CV PCI N/A 9/26/2022    Procedure: Percutaneous Coronary Intervention;  Surgeon: Yury Juan MD;  Location:  HEART CARDIAC CATH LAB     Family History   Problem Relation Age of Onset     CABG Father 60     Cardiomyopathy No family hx of      Valvular heart disease No family hx of             Allergies   Patient has no known allergies.        Michelle Yao NP  [facility] HEART CARE  Pager: [phone]    Thank you for allowing me to participate in the care of your patient.      Sincerely,     Michelle Yao NP     [facility]  cc:   Referred Self,

## 2022-10-25 ENCOUNTER — MYC MEDICAL ADVICE (OUTPATIENT)
Dept: CARDIOLOGY | Facility: CLINIC | Age: 57
End: 2022-10-25

## 2022-10-25 NOTE — TELEPHONE ENCOUNTER
Called pt, states he is not out of Brilinta but would like a generic alternative before his next refill. Will message Floresita Yao NP to review. Elin COREY

## 2022-10-28 DIAGNOSIS — I21.4 NSTEMI (NON-ST ELEVATED MYOCARDIAL INFARCTION) (H): Primary | ICD-10-CM

## 2022-10-28 DIAGNOSIS — Z95.5 S/P DRUG ELUTING CORONARY STENT PLACEMENT: ICD-10-CM

## 2022-10-28 RX ORDER — CLOPIDOGREL 300 MG/1
300 TABLET, FILM COATED ORAL ONCE
Qty: 1 TABLET | Refills: 0 | Status: SHIPPED | OUTPATIENT
Start: 2022-10-28 | End: 2022-10-28

## 2022-10-28 RX ORDER — CLOPIDOGREL BISULFATE 75 MG/1
75 TABLET ORAL DAILY
Qty: 90 TABLET | Refills: 3 | Status: SHIPPED | OUTPATIENT
Start: 2022-10-28

## 2022-10-28 NOTE — TELEPHONE ENCOUNTER
I spoke with Dr. Guevara and she is okay with him switching from Brilinta to Plavix.  His cost per month for Plavix will be $13.     He should stop brillinta after his evening dose and take 300mg plavix the following morning as loading dose. Then 75mg daily starting the next day.    He should continue his aspirin daily.

## 2022-10-28 NOTE — TELEPHONE ENCOUNTER
Attempted to call pt with recommendations from Floresita Yao NP, left message for pt to call back. Will send recommendations via My Chart also. Elin COREY

## 2022-11-08 PROBLEM — I25.10 CAD (CORONARY ARTERY DISEASE): Status: ACTIVE | Noted: 2022-11-08

## 2022-11-10 ENCOUNTER — HOSPITAL ENCOUNTER (OUTPATIENT)
Dept: CARDIOLOGY | Facility: CLINIC | Age: 57
Discharge: HOME | End: 2022-11-10
Attending: INTERNAL MEDICINE | Admitting: INTERNAL MEDICINE

## 2022-11-10 DIAGNOSIS — I21.4 NSTEMI (NON-ST ELEVATED MYOCARDIAL INFARCTION) (H): ICD-10-CM

## 2022-11-10 PROCEDURE — 93350 STRESS TTE ONLY: CPT | Mod: 26 | Performed by: INTERNAL MEDICINE

## 2022-11-10 PROCEDURE — 93018 CV STRESS TEST I&R ONLY: CPT | Performed by: INTERNAL MEDICINE

## 2022-11-10 PROCEDURE — 93016 CV STRESS TEST SUPVJ ONLY: CPT | Performed by: INTERNAL MEDICINE

## 2022-11-10 PROCEDURE — 93321 DOPPLER ECHO F-UP/LMTD STD: CPT | Mod: TC

## 2022-11-10 PROCEDURE — 93325 DOPPLER ECHO COLOR FLOW MAPG: CPT | Mod: 26 | Performed by: INTERNAL MEDICINE

## 2022-11-10 PROCEDURE — 93321 DOPPLER ECHO F-UP/LMTD STD: CPT | Mod: 26 | Performed by: INTERNAL MEDICINE

## 2022-11-10 PROCEDURE — 93350 STRESS TTE ONLY: CPT | Mod: TC

## 2022-11-11 ENCOUNTER — TELEPHONE (OUTPATIENT)
Dept: CARDIOLOGY | Facility: CLINIC | Age: 57
End: 2022-11-11

## 2022-11-11 NOTE — TELEPHONE ENCOUNTER
Called pt as requested & questions answered re: taking 4 tabs or 300 mg the first day & then one tab 75 mg once daily thereafter. Pt advised that is correct. Elin COREY

## 2022-11-11 NOTE — TELEPHONE ENCOUNTER
Health Call Center    Phone Message    May a detailed message be left on voicemail: yes     Reason for Call: Medication Question or concern regarding medication   Prescription Clarification  Name of Medication: clopidogrel (PLAVIX) 75 MG tablet  Prescribing Provider: Maximilian   Pharmacy:   [id number] - [place], [place] - [address]   What on the order needs clarification? Patient called looking to get clarification on the medication directions. Please call patient back to further clarify, thank you.    Action Taken: Message routed to:  Other: Cardiology    Travel Screening: Not Applicable     Thank you!  [facility]

## 2022-11-21 ENCOUNTER — HEALTH MAINTENANCE LETTER (OUTPATIENT)
Age: 57
End: 2022-11-21

## 2022-12-01 ENCOUNTER — TRANSFERRED RECORDS (OUTPATIENT)
Dept: CARDIAC REHAB | Facility: CLINIC | Age: 57
End: 2022-12-01

## 2022-12-26 ENCOUNTER — TELEPHONE (OUTPATIENT)
Dept: CARDIOLOGY | Facility: CLINIC | Age: 57
End: 2022-12-26

## 2022-12-26 NOTE — TELEPHONE ENCOUNTER
Patient canceled OV 12/22/2022 with Dr. Guevara for 3 month cardiac discharge follow up. Stress echo results, flp and cardiac follow up are due. Letter sent to patient.    Rebecca, Mr. Thomas,    Scheduling has not been able to reach you to reschedule your next visit. You are due to establish care with Dr. Guevara for long-term cardiac follow up, discuss your most recent stress echo results and check a fasting lipid profile.    Please call Central Scheduling at [phone] to set up the office visit and pre-visit lab work.    Thank you  Team 2 RNs  [phone]

## 2022-12-26 NOTE — LETTER
December 26, 2022       TO: Jorge Thomas  [address]  [place] [address]       Rebecca Mr. Thomas,    Scheduling has not been able to reach you to reschedule your next visit. You are due to establish care with Dr. Guevara for long-term cardiac follow up, discuss your most recent stress echo results and check a fasting lipid profile.    Please call Central Scheduling at [phone] to set up the office visit and pre-visit lab work.    Thank you  Team 2 RNs  [phone]  [facility]

## 2023-11-25 ENCOUNTER — HEALTH MAINTENANCE LETTER (OUTPATIENT)
Age: 58
End: 2023-11-25

## 2025-03-02 ENCOUNTER — HEALTH MAINTENANCE LETTER (OUTPATIENT)
Age: 60
End: 2025-03-02

## (undated) DEVICE — CATH DIAGNOSTIC RADIAL 5FR TIG 4.0

## (undated) DEVICE — GUIDEWIRE HI-TORQUE VERSACORE MOD J 1

## (undated) DEVICE — CATH ANGIO INFINITI JR4 4FRX100CM 538421

## (undated) DEVICE — INTRO GLIDESHEATH SLENDER 6FR 10X45CM 60-1060

## (undated) DEVICE — SLEEVE TR BAND RADIAL COMPRESSION DEVICE 24CM TRB24-REG

## (undated) DEVICE — INFL DVC BASIXCOMPAK PLYCRB 30 ATM 13IN 20ML IN4530

## (undated) DEVICE — CATH BALLOON NC EMERGE 3.50X12MM H7493926712350

## (undated) DEVICE — MANIFOLD KIT ANGIO AUTOMATED 014613

## (undated) DEVICE — DEFIB PRO-PADZ LVP LQD GEL ADULT 8900-2105-01

## (undated) DEVICE — CATH GUIDING BLUE YELLOW PTFE XB3.5 6FRX100CM 67005400

## (undated) DEVICE — 1.5MM 0.035 J-TIP FIXED CORE PTFE COATED INQWIRE GUIDE WIRE 260CM

## (undated) DEVICE — LINE MONITOR NASAL SMART CAPNOLINE ADULT LONG 12463

## (undated) DEVICE — CATH BALLOON EMERGE 2.0X12MM H7493918912200

## (undated) DEVICE — CATH BALLOON NC EMERGE 3.00X20MM H7493926720300

## (undated) DEVICE — CATH GUIDING BLUE YELLOW PTFE XB3 6FRX100CM 67005200

## (undated) DEVICE — TOTE ANGIO CORP PC15AT SAN32CC83O

## (undated) DEVICE — KIT HAND CONTROL ANGIOTOUCH ACIST 65CM AT-P65

## (undated) DEVICE — WIRE GUIDE 0.035"X260CM SAFE-T-J EXCHANGE G00517

## (undated) DEVICE — CATH BALLOON EMERGE 3.0X12MM H7493918912300

## (undated) DEVICE — GUIDEWIRE VASC 0.014INX180CM RUNTHROUGH 25-1011

## (undated) DEVICE — GUIDEWIRE VASC 0.035INX150CM INQWIRE J TIP IQ35F150J3F/A

## (undated) RX ORDER — FENTANYL CITRATE 50 UG/ML
INJECTION, SOLUTION INTRAMUSCULAR; INTRAVENOUS
Status: DISPENSED
Start: 2022-09-26

## (undated) RX ORDER — LIDOCAINE HYDROCHLORIDE 10 MG/ML
INJECTION, SOLUTION EPIDURAL; INFILTRATION; INTRACAUDAL; PERINEURAL
Status: DISPENSED
Start: 2022-09-26

## (undated) RX ORDER — FENTANYL CITRATE 50 UG/ML
INJECTION, SOLUTION INTRAMUSCULAR; INTRAVENOUS
Status: DISPENSED
Start: 2022-09-27

## (undated) RX ORDER — VERAPAMIL HYDROCHLORIDE 2.5 MG/ML
INJECTION, SOLUTION INTRAVENOUS
Status: DISPENSED
Start: 2022-09-26

## (undated) RX ORDER — HEPARIN SODIUM 1000 [USP'U]/ML
INJECTION, SOLUTION INTRAVENOUS; SUBCUTANEOUS
Status: DISPENSED
Start: 2022-09-27

## (undated) RX ORDER — NITROGLYCERIN 5 MG/ML
VIAL (ML) INTRAVENOUS
Status: DISPENSED
Start: 2022-09-26

## (undated) RX ORDER — NITROGLYCERIN 5 MG/ML
VIAL (ML) INTRAVENOUS
Status: DISPENSED
Start: 2022-09-27

## (undated) RX ORDER — HEPARIN SODIUM 1000 [USP'U]/ML
INJECTION, SOLUTION INTRAVENOUS; SUBCUTANEOUS
Status: DISPENSED
Start: 2022-09-26

## (undated) RX ORDER — LIDOCAINE HYDROCHLORIDE 10 MG/ML
INJECTION, SOLUTION EPIDURAL; INFILTRATION; INTRACAUDAL; PERINEURAL
Status: DISPENSED
Start: 2022-09-27

## (undated) RX ORDER — HEPARIN SODIUM 200 [USP'U]/100ML
INJECTION, SOLUTION INTRAVENOUS
Status: DISPENSED
Start: 2022-09-26

## (undated) RX ORDER — VERAPAMIL HYDROCHLORIDE 2.5 MG/ML
INJECTION, SOLUTION INTRAVENOUS
Status: DISPENSED
Start: 2022-09-27

## (undated) RX ORDER — HEPARIN SODIUM 200 [USP'U]/100ML
INJECTION, SOLUTION INTRAVENOUS
Status: DISPENSED
Start: 2022-09-27